# Patient Record
Sex: FEMALE | Race: WHITE | NOT HISPANIC OR LATINO
[De-identification: names, ages, dates, MRNs, and addresses within clinical notes are randomized per-mention and may not be internally consistent; named-entity substitution may affect disease eponyms.]

---

## 2017-01-12 ENCOUNTER — APPOINTMENT (OUTPATIENT)
Dept: VASCULAR SURGERY | Facility: CLINIC | Age: 62
End: 2017-01-12

## 2017-01-12 VITALS
BODY MASS INDEX: 27 KG/M2 | DIASTOLIC BLOOD PRESSURE: 80 MMHG | TEMPERATURE: 98.5 F | SYSTOLIC BLOOD PRESSURE: 126 MMHG | HEIGHT: 61 IN | HEART RATE: 79 BPM | WEIGHT: 143 LBS

## 2017-01-12 VITALS — DIASTOLIC BLOOD PRESSURE: 80 MMHG | SYSTOLIC BLOOD PRESSURE: 123 MMHG | HEART RATE: 76 BPM

## 2017-01-20 ENCOUNTER — APPOINTMENT (OUTPATIENT)
Dept: INTERNAL MEDICINE | Facility: CLINIC | Age: 62
End: 2017-01-20

## 2017-01-20 VITALS
RESPIRATION RATE: 16 BRPM | TEMPERATURE: 98.3 F | WEIGHT: 143 LBS | DIASTOLIC BLOOD PRESSURE: 76 MMHG | OXYGEN SATURATION: 98 % | HEIGHT: 61 IN | HEART RATE: 70 BPM | SYSTOLIC BLOOD PRESSURE: 120 MMHG | BODY MASS INDEX: 27 KG/M2

## 2017-02-13 ENCOUNTER — MEDICATION RENEWAL (OUTPATIENT)
Age: 62
End: 2017-02-13

## 2017-02-17 ENCOUNTER — APPOINTMENT (OUTPATIENT)
Dept: VASCULAR SURGERY | Facility: CLINIC | Age: 62
End: 2017-02-17

## 2017-02-21 ENCOUNTER — APPOINTMENT (OUTPATIENT)
Dept: VASCULAR SURGERY | Facility: CLINIC | Age: 62
End: 2017-02-21

## 2017-03-09 ENCOUNTER — APPOINTMENT (OUTPATIENT)
Dept: VASCULAR SURGERY | Facility: CLINIC | Age: 62
End: 2017-03-09

## 2017-03-09 VITALS
HEART RATE: 87 BPM | SYSTOLIC BLOOD PRESSURE: 123 MMHG | TEMPERATURE: 98.2 F | DIASTOLIC BLOOD PRESSURE: 77 MMHG | WEIGHT: 143 LBS | HEIGHT: 61 IN | BODY MASS INDEX: 27 KG/M2

## 2017-03-09 VITALS — HEART RATE: 78 BPM | SYSTOLIC BLOOD PRESSURE: 117 MMHG | DIASTOLIC BLOOD PRESSURE: 79 MMHG

## 2017-06-15 ENCOUNTER — APPOINTMENT (OUTPATIENT)
Dept: ORTHOPEDIC SURGERY | Facility: CLINIC | Age: 62
End: 2017-06-15

## 2017-06-15 DIAGNOSIS — Z12.11 ENCOUNTER FOR SCREENING FOR MALIGNANT NEOPLASM OF COLON: ICD-10-CM

## 2017-07-03 ENCOUNTER — OUTPATIENT (OUTPATIENT)
Dept: OUTPATIENT SERVICES | Facility: HOSPITAL | Age: 62
LOS: 1 days | End: 2017-07-03
Payer: MEDICAID

## 2017-07-03 ENCOUNTER — APPOINTMENT (OUTPATIENT)
Dept: INTERNAL MEDICINE | Facility: CLINIC | Age: 62
End: 2017-07-03

## 2017-07-03 ENCOUNTER — NON-APPOINTMENT (OUTPATIENT)
Age: 62
End: 2017-07-03

## 2017-07-03 VITALS
HEART RATE: 64 BPM | HEIGHT: 61 IN | BODY MASS INDEX: 26.43 KG/M2 | TEMPERATURE: 98.6 F | SYSTOLIC BLOOD PRESSURE: 120 MMHG | WEIGHT: 140 LBS | DIASTOLIC BLOOD PRESSURE: 70 MMHG | OXYGEN SATURATION: 97 %

## 2017-07-03 VITALS
HEART RATE: 63 BPM | HEIGHT: 61 IN | SYSTOLIC BLOOD PRESSURE: 120 MMHG | WEIGHT: 139.99 LBS | TEMPERATURE: 97 F | RESPIRATION RATE: 16 BRPM | OXYGEN SATURATION: 97 % | DIASTOLIC BLOOD PRESSURE: 74 MMHG

## 2017-07-03 DIAGNOSIS — M65.30 TRIGGER FINGER, UNSPECIFIED FINGER: ICD-10-CM

## 2017-07-03 DIAGNOSIS — Z98.890 OTHER SPECIFIED POSTPROCEDURAL STATES: Chronic | ICD-10-CM

## 2017-07-03 DIAGNOSIS — Z98.51 TUBAL LIGATION STATUS: Chronic | ICD-10-CM

## 2017-07-03 DIAGNOSIS — I83.90 ASYMPTOMATIC VARICOSE VEINS OF UNSPECIFIED LOWER EXTREMITY: ICD-10-CM

## 2017-07-03 DIAGNOSIS — G56.02 CARPAL TUNNEL SYNDROME, LEFT UPPER LIMB: Chronic | ICD-10-CM

## 2017-07-03 DIAGNOSIS — Z01.818 ENCOUNTER FOR OTHER PREPROCEDURAL EXAMINATION: ICD-10-CM

## 2017-07-03 DIAGNOSIS — M65.332 TRIGGER FINGER, LEFT MIDDLE FINGER: ICD-10-CM

## 2017-07-03 LAB
ANION GAP SERPL CALC-SCNC: 19 MMOL/L — HIGH (ref 5–17)
BUN SERPL-MCNC: 12 MG/DL — SIGNIFICANT CHANGE UP (ref 7–23)
CALCIUM SERPL-MCNC: 9.8 MG/DL — SIGNIFICANT CHANGE UP (ref 8.4–10.5)
CHLORIDE SERPL-SCNC: 105 MMOL/L — SIGNIFICANT CHANGE UP (ref 96–108)
CO2 SERPL-SCNC: 21 MMOL/L — LOW (ref 22–31)
CREAT SERPL-MCNC: 0.83 MG/DL — SIGNIFICANT CHANGE UP (ref 0.5–1.3)
GLUCOSE SERPL-MCNC: 73 MG/DL — SIGNIFICANT CHANGE UP (ref 70–99)
HCT VFR BLD CALC: 38.9 % — SIGNIFICANT CHANGE UP (ref 34.5–45)
HGB BLD-MCNC: 12.7 G/DL — SIGNIFICANT CHANGE UP (ref 11.5–15.5)
MCHC RBC-ENTMCNC: 29.7 PG — SIGNIFICANT CHANGE UP (ref 27–34)
MCHC RBC-ENTMCNC: 32.6 GM/DL — SIGNIFICANT CHANGE UP (ref 32–36)
MCV RBC AUTO: 91.1 FL — SIGNIFICANT CHANGE UP (ref 80–100)
PLATELET # BLD AUTO: 213 K/UL — SIGNIFICANT CHANGE UP (ref 150–400)
POTASSIUM SERPL-MCNC: 4.3 MMOL/L — SIGNIFICANT CHANGE UP (ref 3.5–5.3)
POTASSIUM SERPL-SCNC: 4.3 MMOL/L — SIGNIFICANT CHANGE UP (ref 3.5–5.3)
RBC # BLD: 4.27 M/UL — SIGNIFICANT CHANGE UP (ref 3.8–5.2)
RBC # FLD: 14.1 % — SIGNIFICANT CHANGE UP (ref 10.3–14.5)
SODIUM SERPL-SCNC: 145 MMOL/L — SIGNIFICANT CHANGE UP (ref 135–145)
WBC # BLD: 5.56 K/UL — SIGNIFICANT CHANGE UP (ref 3.8–10.5)
WBC # FLD AUTO: 5.56 K/UL — SIGNIFICANT CHANGE UP (ref 3.8–10.5)

## 2017-07-03 PROCEDURE — 85027 COMPLETE CBC AUTOMATED: CPT

## 2017-07-03 PROCEDURE — G0463: CPT

## 2017-07-03 PROCEDURE — 80048 BASIC METABOLIC PNL TOTAL CA: CPT

## 2017-07-03 RX ORDER — AZITHROMYCIN 250 MG/1
250 TABLET, FILM COATED ORAL
Qty: 6 | Refills: 0 | Status: DISCONTINUED | COMMUNITY
Start: 2017-01-02 | End: 2017-07-03

## 2017-07-03 RX ORDER — ALBUTEROL SULFATE 90 UG/1
108 (90 BASE) AEROSOL, METERED RESPIRATORY (INHALATION)
Qty: 18 | Refills: 0 | Status: DISCONTINUED | COMMUNITY
Start: 2017-01-13 | End: 2017-07-03

## 2017-07-03 RX ORDER — LIDOCAINE HCL 20 MG/ML
0.2 VIAL (ML) INJECTION ONCE
Qty: 0 | Refills: 0 | Status: DISCONTINUED | OUTPATIENT
Start: 2017-07-07 | End: 2017-07-22

## 2017-07-03 RX ORDER — ALPRAZOLAM 0.5 MG/1
0.5 TABLET ORAL
Qty: 1 | Refills: 0 | Status: DISCONTINUED | COMMUNITY
Start: 2017-02-13 | End: 2017-07-03

## 2017-07-03 RX ORDER — SODIUM CHLORIDE 9 MG/ML
3 INJECTION INTRAMUSCULAR; INTRAVENOUS; SUBCUTANEOUS EVERY 8 HOURS
Qty: 0 | Refills: 0 | Status: DISCONTINUED | OUTPATIENT
Start: 2017-07-07 | End: 2017-07-22

## 2017-07-03 RX ORDER — LEVOCETIRIZINE DIHYDROCHLORIDE 5 MG/1
5 TABLET ORAL DAILY
Qty: 30 | Refills: 0 | Status: DISCONTINUED | COMMUNITY
Start: 2017-01-20 | End: 2017-07-03

## 2017-07-03 RX ORDER — CELECOXIB 200 MG/1
200 CAPSULE ORAL ONCE
Qty: 0 | Refills: 0 | Status: COMPLETED | OUTPATIENT
Start: 2017-07-07 | End: 2017-07-07

## 2017-07-03 RX ORDER — MOMETASONE 50 UG/1
50 SPRAY, METERED NASAL TWICE DAILY
Qty: 1 | Refills: 2 | Status: DISCONTINUED | COMMUNITY
Start: 2017-01-20 | End: 2017-07-03

## 2017-07-03 RX ORDER — PREDNISONE 10 MG/1
10 TABLET ORAL
Qty: 18 | Refills: 0 | Status: DISCONTINUED | COMMUNITY
Start: 2017-01-13 | End: 2017-07-03

## 2017-07-03 RX ORDER — ACETAMINOPHEN 500 MG
975 TABLET ORAL ONCE
Qty: 0 | Refills: 0 | Status: COMPLETED | OUTPATIENT
Start: 2017-07-07 | End: 2017-07-07

## 2017-07-03 NOTE — H&P PST ADULT - HISTORY OF PRESENT ILLNESS
This is a 60 y.o. female who presented to Dr. Aldana complaining of "tingling" of right hand. Pt evaluated , has right carpal tunnel now for surgery. This is a 60 y.o. female who presented to Dr. Aldana complaining of "tingling" of right hand. Pt evaluated , has right carpal tunnel now for surgery.  th This is a 61 y/o female with PMH: HLD,GERD. s/p (1/2017): Right Leg Varicose Vein Stripping. Now dx: Left Middle Trigger Finger. Scheduled: Left Middle Finger Trigger Release.

## 2017-07-03 NOTE — H&P PST ADULT - NSANTHOSAYNRD_GEN_A_CORE
No. SAI screening performed.  STOP BANG Legend: 0-2 = LOW Risk; 3-4 = INTERMEDIATE Risk; 5-8 = HIGH Risk

## 2017-07-03 NOTE — H&P PST ADULT - FAMILY HISTORY
Mother  Still living? No  Family history of cancer, Age at diagnosis: Age Unknown     Father  Still living? No  Family history of cardiac disorder, Age at diagnosis: Age Unknown

## 2017-07-03 NOTE — H&P PST ADULT - PROBLEM SELECTOR PLAN 2
s/p 1/2017:  Right Leg Varicose Vein Strippin. On ASA 81 mg daily  plan: Last dose ASA 6-20-17 as per PMD

## 2017-07-03 NOTE — H&P PST ADULT - PMH
Carpal tunnel syndrome  Bilateral : surgically treated '04 and '15  GERD (gastroesophageal reflux disease)    Hyperlipidemia  5 years  Multiparity    Varicose vein of leg  1/2017:  Right Leg: surgically treated Carpal tunnel syndrome  Bilateral : surgically treated '04 and '15  GERD (gastroesophageal reflux disease)    Hyperlipidemia  5 years  Multiparity    Trigger finger  Left Middle Finger  Varicose vein of leg  1/2017:  Right Leg: surgically treated

## 2017-07-03 NOTE — H&P PST ADULT - PSH
H/O tubal ligation  ' 1985 H/O tubal ligation  ' 1985  H/O varicose vein stripping  1/2017:  Right Leg  S/p bilateral carpal tunnel release  ' 2004:  Left    ' 2015:  Right  Status post colonoscopy  ' 16   Negative  Status post tubal ligation  ' 85

## 2017-07-07 ENCOUNTER — TRANSCRIPTION ENCOUNTER (OUTPATIENT)
Age: 62
End: 2017-07-07

## 2017-07-07 ENCOUNTER — APPOINTMENT (OUTPATIENT)
Dept: ORTHOPEDIC SURGERY | Facility: HOSPITAL | Age: 62
End: 2017-07-07

## 2017-07-07 ENCOUNTER — OUTPATIENT (OUTPATIENT)
Dept: OUTPATIENT SERVICES | Facility: HOSPITAL | Age: 62
LOS: 1 days | End: 2017-07-07
Payer: MEDICAID

## 2017-07-07 VITALS
RESPIRATION RATE: 18 BRPM | HEART RATE: 70 BPM | SYSTOLIC BLOOD PRESSURE: 122 MMHG | DIASTOLIC BLOOD PRESSURE: 70 MMHG | OXYGEN SATURATION: 100 %

## 2017-07-07 VITALS — WEIGHT: 138.89 LBS | HEIGHT: 60.63 IN

## 2017-07-07 DIAGNOSIS — Z98.890 OTHER SPECIFIED POSTPROCEDURAL STATES: Chronic | ICD-10-CM

## 2017-07-07 DIAGNOSIS — Z98.51 TUBAL LIGATION STATUS: Chronic | ICD-10-CM

## 2017-07-07 DIAGNOSIS — M65.332 TRIGGER FINGER, LEFT MIDDLE FINGER: ICD-10-CM

## 2017-07-07 PROCEDURE — 26055 INCISE FINGER TENDON SHEATH: CPT | Mod: F2

## 2017-07-07 RX ORDER — SODIUM CHLORIDE 9 MG/ML
1000 INJECTION, SOLUTION INTRAVENOUS
Qty: 0 | Refills: 0 | Status: DISCONTINUED | OUTPATIENT
Start: 2017-07-07 | End: 2017-07-22

## 2017-07-07 RX ORDER — ONDANSETRON 8 MG/1
4 TABLET, FILM COATED ORAL ONCE
Qty: 0 | Refills: 0 | Status: DISCONTINUED | OUTPATIENT
Start: 2017-07-07 | End: 2017-07-22

## 2017-07-07 RX ORDER — OXYCODONE HYDROCHLORIDE 5 MG/1
5 TABLET ORAL ONCE
Qty: 0 | Refills: 0 | Status: DISCONTINUED | OUTPATIENT
Start: 2017-07-07 | End: 2017-07-07

## 2017-07-07 RX ORDER — CELECOXIB 200 MG/1
200 CAPSULE ORAL ONCE
Qty: 0 | Refills: 0 | Status: DISCONTINUED | OUTPATIENT
Start: 2017-07-07 | End: 2017-07-22

## 2017-07-07 RX ADMIN — CELECOXIB 200 MILLIGRAM(S): 200 CAPSULE ORAL at 09:41

## 2017-07-07 RX ADMIN — Medication 975 MILLIGRAM(S): at 09:41

## 2017-07-07 NOTE — ASU DISCHARGE PLAN (ADULT/PEDIATRIC). - NOTIFY
Swelling that continues/Numbness, color, or temperature change to extremity/Pain not relieved by Medications/Fever greater than 101/Bleeding that does not stop

## 2017-07-07 NOTE — ASU DISCHARGE PLAN (ADULT/PEDIATRIC). - MEDICATION SUMMARY - MEDICATIONS TO TAKE
I will START or STAY ON the medications listed below when I get home from the hospital:    Asprin  -- 81 milligram(s) by mouth once a day (at bedtime). * Last dose 6-20-17 as per PMD  -- Indication: For home med    Norco 5 mg-325 mg oral tablet  -- 1 tab(s) by mouth every 6 hours  -- Indication: For pain    atorvastatin 20 mg oral tablet  -- 1 tab(s) by mouth once a day (at bedtime)  -- Indication: For home med    PriLOSEC OTC 20 mg oral delayed release tablet  -- 1 tab(s) by mouth once a day (at bedtime)  -- Indication: For home med

## 2017-07-07 NOTE — ASU PATIENT PROFILE, ADULT - PMH
Carpal tunnel syndrome  Bilateral : surgically treated '04 and '15  GERD (gastroesophageal reflux disease)    Hyperlipidemia  5 years  Multiparity    Trigger finger  Left Middle Finger  Varicose vein of leg  1/2017:  Right Leg: surgically treated

## 2017-07-07 NOTE — ASU PATIENT PROFILE, ADULT - VISION (WITH CORRECTIVE LENSES IF THE PATIENT USUALLY WEARS THEM):
Partially impaired: cannot see medication labels or newsprint, but can see obstacles in path, and the surrounding layout; can count fingers at arm's length reading/Partially impaired: cannot see medication labels or newsprint, but can see obstacles in path, and the surrounding layout; can count fingers at arm's length

## 2017-07-07 NOTE — ASU PREOP CHECKLIST - TO WHOM
OR 2 RN Trish OR 2 LAVERN Chambers report received from PRIYA Pickett RN to Trish Dueñas RN to ANAIS Andrea RN

## 2017-07-14 ENCOUNTER — RX RENEWAL (OUTPATIENT)
Age: 62
End: 2017-07-14

## 2017-07-24 ENCOUNTER — APPOINTMENT (OUTPATIENT)
Dept: ORTHOPEDIC SURGERY | Facility: CLINIC | Age: 62
End: 2017-07-24

## 2017-11-03 ENCOUNTER — APPOINTMENT (OUTPATIENT)
Dept: ORTHOPEDIC SURGERY | Facility: CLINIC | Age: 62
End: 2017-11-03
Payer: MEDICAID

## 2017-11-03 VITALS
BODY MASS INDEX: 27 KG/M2 | DIASTOLIC BLOOD PRESSURE: 86 MMHG | SYSTOLIC BLOOD PRESSURE: 139 MMHG | HEIGHT: 61 IN | WEIGHT: 143 LBS | HEART RATE: 64 BPM

## 2017-11-03 DIAGNOSIS — Z87.898 PERSONAL HISTORY OF OTHER SPECIFIED CONDITIONS: ICD-10-CM

## 2017-11-03 PROCEDURE — 72100 X-RAY EXAM L-S SPINE 2/3 VWS: CPT

## 2017-11-03 PROCEDURE — 99215 OFFICE O/P EST HI 40 MIN: CPT

## 2017-11-20 ENCOUNTER — RX RENEWAL (OUTPATIENT)
Age: 62
End: 2017-11-20

## 2017-11-27 ENCOUNTER — APPOINTMENT (OUTPATIENT)
Dept: ORTHOPEDIC SURGERY | Facility: CLINIC | Age: 62
End: 2017-11-27
Payer: MEDICAID

## 2017-11-27 PROCEDURE — 99214 OFFICE O/P EST MOD 30 MIN: CPT

## 2017-11-29 ENCOUNTER — APPOINTMENT (OUTPATIENT)
Dept: INTERNAL MEDICINE | Facility: CLINIC | Age: 62
End: 2017-11-29
Payer: MEDICAID

## 2017-11-29 VITALS
BODY MASS INDEX: 27 KG/M2 | DIASTOLIC BLOOD PRESSURE: 74 MMHG | SYSTOLIC BLOOD PRESSURE: 124 MMHG | HEART RATE: 66 BPM | TEMPERATURE: 98.7 F | WEIGHT: 143 LBS | OXYGEN SATURATION: 97 % | HEIGHT: 61 IN

## 2017-11-29 PROCEDURE — 99396 PREV VISIT EST AGE 40-64: CPT | Mod: 25

## 2017-11-29 PROCEDURE — 36415 COLL VENOUS BLD VENIPUNCTURE: CPT

## 2017-11-29 PROCEDURE — 90688 IIV4 VACCINE SPLT 0.5 ML IM: CPT

## 2017-11-29 PROCEDURE — G0008: CPT

## 2017-11-29 RX ORDER — HYDROCODONE BITARTRATE AND ACETAMINOPHEN 5; 325 MG/1; MG/1
5-325 TABLET ORAL
Qty: 10 | Refills: 0 | Status: DISCONTINUED | COMMUNITY
Start: 2017-07-05 | End: 2017-11-29

## 2017-11-29 RX ORDER — NAPROXEN 500 MG/1
500 TABLET ORAL
Qty: 60 | Refills: 0 | Status: DISCONTINUED | COMMUNITY
Start: 2017-11-03 | End: 2017-11-29

## 2017-11-30 ENCOUNTER — MOBILE ON CALL (OUTPATIENT)
Age: 62
End: 2017-11-30

## 2017-12-02 LAB
CHOLEST SERPL-MCNC: 151 MG/DL
CHOLEST/HDLC SERPL: 2.9 RATIO
HBA1C MFR BLD HPLC: 5.7 %
HCV AB SER QL: NONREACTIVE
HCV S/CO RATIO: 0.29 S/CO
HDLC SERPL-MCNC: 52 MG/DL
LDLC SERPL CALC-MCNC: 73 MG/DL
TRIGL SERPL-MCNC: 129 MG/DL

## 2017-12-14 LAB
ALBUMIN SERPL ELPH-MCNC: 4.6 G/DL
ALP BLD-CCNC: 84 U/L
ALT SERPL-CCNC: 9 U/L
ANION GAP SERPL CALC-SCNC: 15 MMOL/L
AST SERPL-CCNC: 18 U/L
BASOPHILS # BLD AUTO: 0.03 K/UL
BASOPHILS NFR BLD AUTO: 0.6 %
BILIRUB SERPL-MCNC: 0.3 MG/DL
BUN SERPL-MCNC: 13 MG/DL
CALCIUM SERPL-MCNC: 9.5 MG/DL
CHLORIDE SERPL-SCNC: 106 MMOL/L
CO2 SERPL-SCNC: 25 MMOL/L
CREAT SERPL-MCNC: 0.75 MG/DL
EOSINOPHIL # BLD AUTO: 0.4 K/UL
EOSINOPHIL NFR BLD AUTO: 8.1 %
GLUCOSE SERPL-MCNC: 87 MG/DL
HCT VFR BLD CALC: 38.4 %
HGB BLD-MCNC: 12.2 G/DL
IMM GRANULOCYTES NFR BLD AUTO: 0 %
LYMPHOCYTES # BLD AUTO: 1.88 K/UL
LYMPHOCYTES NFR BLD AUTO: 38 %
MAN DIFF?: NORMAL
MCHC RBC-ENTMCNC: 30 PG
MCHC RBC-ENTMCNC: 31.8 GM/DL
MCV RBC AUTO: 94.3 FL
MONOCYTES # BLD AUTO: 0.42 K/UL
MONOCYTES NFR BLD AUTO: 8.5 %
NEUTROPHILS # BLD AUTO: 2.22 K/UL
NEUTROPHILS NFR BLD AUTO: 44.8 %
PLATELET # BLD AUTO: 233 K/UL
POTASSIUM SERPL-SCNC: 4.5 MMOL/L
PROT SERPL-MCNC: 7.2 G/DL
RBC # BLD: 4.07 M/UL
RBC # FLD: 13.4 %
SODIUM SERPL-SCNC: 146 MMOL/L
WBC # FLD AUTO: 4.95 K/UL

## 2017-12-28 ENCOUNTER — APPOINTMENT (OUTPATIENT)
Dept: ORTHOPEDIC SURGERY | Facility: CLINIC | Age: 62
End: 2017-12-28
Payer: MEDICAID

## 2017-12-28 PROCEDURE — 99214 OFFICE O/P EST MOD 30 MIN: CPT

## 2018-01-18 ENCOUNTER — APPOINTMENT (OUTPATIENT)
Dept: ORTHOPEDIC SURGERY | Facility: CLINIC | Age: 63
End: 2018-01-18
Payer: MEDICAID

## 2018-01-18 PROCEDURE — 99214 OFFICE O/P EST MOD 30 MIN: CPT

## 2018-01-26 ENCOUNTER — FORM ENCOUNTER (OUTPATIENT)
Age: 63
End: 2018-01-26

## 2018-01-27 ENCOUNTER — OUTPATIENT (OUTPATIENT)
Dept: OUTPATIENT SERVICES | Facility: HOSPITAL | Age: 63
LOS: 1 days | End: 2018-01-27
Payer: MEDICAID

## 2018-01-27 ENCOUNTER — APPOINTMENT (OUTPATIENT)
Dept: MRI IMAGING | Facility: CLINIC | Age: 63
End: 2018-01-27
Payer: MEDICAID

## 2018-01-27 DIAGNOSIS — Z98.51 TUBAL LIGATION STATUS: Chronic | ICD-10-CM

## 2018-01-27 DIAGNOSIS — Z98.890 OTHER SPECIFIED POSTPROCEDURAL STATES: Chronic | ICD-10-CM

## 2018-01-27 DIAGNOSIS — Z00.8 ENCOUNTER FOR OTHER GENERAL EXAMINATION: ICD-10-CM

## 2018-01-27 PROCEDURE — 72148 MRI LUMBAR SPINE W/O DYE: CPT | Mod: 26

## 2018-01-27 PROCEDURE — 72148 MRI LUMBAR SPINE W/O DYE: CPT

## 2018-02-01 ENCOUNTER — APPOINTMENT (OUTPATIENT)
Dept: ORTHOPEDIC SURGERY | Facility: CLINIC | Age: 63
End: 2018-02-01
Payer: MEDICAID

## 2018-02-01 PROCEDURE — 99214 OFFICE O/P EST MOD 30 MIN: CPT

## 2018-03-01 ENCOUNTER — OUTPATIENT (OUTPATIENT)
Dept: OUTPATIENT SERVICES | Facility: HOSPITAL | Age: 63
LOS: 1 days | End: 2018-03-01
Payer: MEDICAID

## 2018-03-01 DIAGNOSIS — Z98.51 TUBAL LIGATION STATUS: Chronic | ICD-10-CM

## 2018-03-01 DIAGNOSIS — M54.16 RADICULOPATHY, LUMBAR REGION: ICD-10-CM

## 2018-03-01 DIAGNOSIS — Z98.890 OTHER SPECIFIED POSTPROCEDURAL STATES: Chronic | ICD-10-CM

## 2018-03-01 PROCEDURE — 77003 FLUOROGUIDE FOR SPINE INJECT: CPT

## 2018-03-01 PROCEDURE — 62323 NJX INTERLAMINAR LMBR/SAC: CPT

## 2018-03-12 ENCOUNTER — APPOINTMENT (OUTPATIENT)
Dept: GASTROENTEROLOGY | Facility: CLINIC | Age: 63
End: 2018-03-12

## 2018-04-12 ENCOUNTER — OUTPATIENT (OUTPATIENT)
Dept: OUTPATIENT SERVICES | Facility: HOSPITAL | Age: 63
LOS: 1 days | End: 2018-04-12
Payer: MEDICAID

## 2018-04-12 DIAGNOSIS — Z98.890 OTHER SPECIFIED POSTPROCEDURAL STATES: Chronic | ICD-10-CM

## 2018-04-12 DIAGNOSIS — Z98.51 TUBAL LIGATION STATUS: Chronic | ICD-10-CM

## 2018-04-12 DIAGNOSIS — M54.16 RADICULOPATHY, LUMBAR REGION: ICD-10-CM

## 2018-04-12 PROCEDURE — 77003 FLUOROGUIDE FOR SPINE INJECT: CPT

## 2018-04-12 PROCEDURE — 62323 NJX INTERLAMINAR LMBR/SAC: CPT

## 2018-05-11 ENCOUNTER — MOBILE ON CALL (OUTPATIENT)
Age: 63
End: 2018-05-11

## 2018-05-11 ENCOUNTER — APPOINTMENT (OUTPATIENT)
Dept: VASCULAR SURGERY | Facility: CLINIC | Age: 63
End: 2018-05-11
Payer: MEDICAID

## 2018-05-11 VITALS — SYSTOLIC BLOOD PRESSURE: 133 MMHG | DIASTOLIC BLOOD PRESSURE: 81 MMHG | HEART RATE: 61 BPM

## 2018-05-11 VITALS
BODY MASS INDEX: 28.89 KG/M2 | HEART RATE: 66 BPM | WEIGHT: 153 LBS | DIASTOLIC BLOOD PRESSURE: 81 MMHG | TEMPERATURE: 98.1 F | SYSTOLIC BLOOD PRESSURE: 135 MMHG | HEIGHT: 61 IN

## 2018-05-11 PROCEDURE — 93970 EXTREMITY STUDY: CPT

## 2018-05-11 PROCEDURE — 99212 OFFICE O/P EST SF 10 MIN: CPT

## 2018-06-04 ENCOUNTER — MEDICATION RENEWAL (OUTPATIENT)
Age: 63
End: 2018-06-04

## 2018-06-04 RX ORDER — ALPRAZOLAM 0.5 MG/1
0.5 TABLET ORAL
Qty: 1 | Refills: 0 | Status: COMPLETED | COMMUNITY
Start: 2018-06-04 | End: 2018-06-09

## 2018-06-04 RX ORDER — SODIUM CHLORIDE 9 G/ML
0.9 INJECTION, SOLUTION INTRAVENOUS
Qty: 500 | Refills: 0 | Status: COMPLETED | COMMUNITY
Start: 2018-06-04 | End: 2018-06-09

## 2018-06-04 RX ORDER — LIDOCAINE HYDROCHLORIDE 10 MG/ML
1 INJECTION, SOLUTION INFILTRATION; PERINEURAL
Qty: 50 | Refills: 0 | Status: COMPLETED | COMMUNITY
Start: 2018-06-04 | End: 2018-06-09

## 2018-06-04 RX ORDER — SODIUM BICARBONATE 84 MG/ML
8.4 INJECTION, SOLUTION INTRAVENOUS
Qty: 5 | Refills: 0 | Status: COMPLETED | COMMUNITY
Start: 2018-06-04 | End: 2018-06-09

## 2018-06-08 ENCOUNTER — APPOINTMENT (OUTPATIENT)
Dept: VASCULAR SURGERY | Facility: CLINIC | Age: 63
End: 2018-06-08
Payer: MEDICAID

## 2018-06-08 PROCEDURE — 36475 ENDOVENOUS RF 1ST VEIN: CPT | Mod: RT

## 2018-06-08 PROCEDURE — 37765 STAB PHLEB VEINS XTR 10-20: CPT | Mod: RT

## 2018-06-12 ENCOUNTER — APPOINTMENT (OUTPATIENT)
Dept: VASCULAR SURGERY | Facility: CLINIC | Age: 63
End: 2018-06-12
Payer: MEDICAID

## 2018-06-12 PROCEDURE — 93970 EXTREMITY STUDY: CPT

## 2018-06-21 ENCOUNTER — OUTPATIENT (OUTPATIENT)
Dept: OUTPATIENT SERVICES | Facility: HOSPITAL | Age: 63
LOS: 1 days | End: 2018-06-21
Payer: MEDICAID

## 2018-06-21 DIAGNOSIS — Z98.890 OTHER SPECIFIED POSTPROCEDURAL STATES: Chronic | ICD-10-CM

## 2018-06-21 DIAGNOSIS — Z98.51 TUBAL LIGATION STATUS: Chronic | ICD-10-CM

## 2018-06-21 DIAGNOSIS — M54.16 RADICULOPATHY, LUMBAR REGION: ICD-10-CM

## 2018-06-21 PROCEDURE — 77003 FLUOROGUIDE FOR SPINE INJECT: CPT

## 2018-06-21 PROCEDURE — 62323 NJX INTERLAMINAR LMBR/SAC: CPT

## 2018-06-22 ENCOUNTER — APPOINTMENT (OUTPATIENT)
Dept: ORTHOPEDIC SURGERY | Facility: CLINIC | Age: 63
End: 2018-06-22
Payer: MEDICAID

## 2018-06-22 PROCEDURE — 99214 OFFICE O/P EST MOD 30 MIN: CPT

## 2018-07-01 ENCOUNTER — OUTPATIENT (OUTPATIENT)
Dept: OUTPATIENT SERVICES | Facility: HOSPITAL | Age: 63
LOS: 1 days | End: 2018-07-01
Payer: MEDICAID

## 2018-07-01 DIAGNOSIS — Z98.890 OTHER SPECIFIED POSTPROCEDURAL STATES: Chronic | ICD-10-CM

## 2018-07-01 DIAGNOSIS — Z98.51 TUBAL LIGATION STATUS: Chronic | ICD-10-CM

## 2018-07-01 PROCEDURE — G9001: CPT

## 2018-07-06 ENCOUNTER — APPOINTMENT (OUTPATIENT)
Dept: VASCULAR SURGERY | Facility: CLINIC | Age: 63
End: 2018-07-06
Payer: MEDICAID

## 2018-07-06 VITALS
TEMPERATURE: 98.3 F | HEIGHT: 61 IN | SYSTOLIC BLOOD PRESSURE: 126 MMHG | WEIGHT: 153 LBS | DIASTOLIC BLOOD PRESSURE: 84 MMHG | BODY MASS INDEX: 28.89 KG/M2 | HEART RATE: 72 BPM

## 2018-07-06 VITALS — HEART RATE: 67 BPM | SYSTOLIC BLOOD PRESSURE: 126 MMHG | DIASTOLIC BLOOD PRESSURE: 83 MMHG

## 2018-07-06 PROCEDURE — 99024 POSTOP FOLLOW-UP VISIT: CPT

## 2018-07-16 ENCOUNTER — APPOINTMENT (OUTPATIENT)
Dept: ORTHOPEDIC SURGERY | Facility: CLINIC | Age: 63
End: 2018-07-16
Payer: MEDICAID

## 2018-07-16 PROCEDURE — 99215 OFFICE O/P EST HI 40 MIN: CPT

## 2018-07-18 PROBLEM — G56.00 CARPAL TUNNEL SYNDROME, UNSPECIFIED UPPER LIMB: Chronic | Status: ACTIVE | Noted: 2017-07-03

## 2018-07-18 PROBLEM — I83.90 ASYMPTOMATIC VARICOSE VEINS OF UNSPECIFIED LOWER EXTREMITY: Chronic | Status: ACTIVE | Noted: 2017-07-03

## 2018-07-20 ENCOUNTER — OUTPATIENT (OUTPATIENT)
Dept: OUTPATIENT SERVICES | Facility: HOSPITAL | Age: 63
LOS: 1 days | End: 2018-07-20
Payer: MEDICAID

## 2018-07-20 VITALS
SYSTOLIC BLOOD PRESSURE: 121 MMHG | HEIGHT: 61 IN | RESPIRATION RATE: 15 BRPM | WEIGHT: 147.05 LBS | HEART RATE: 81 BPM | TEMPERATURE: 98 F | DIASTOLIC BLOOD PRESSURE: 81 MMHG | OXYGEN SATURATION: 99 %

## 2018-07-20 DIAGNOSIS — M51.26 OTHER INTERVERTEBRAL DISC DISPLACEMENT, LUMBAR REGION: ICD-10-CM

## 2018-07-20 DIAGNOSIS — Z98.890 OTHER SPECIFIED POSTPROCEDURAL STATES: Chronic | ICD-10-CM

## 2018-07-20 DIAGNOSIS — Z29.9 ENCOUNTER FOR PROPHYLACTIC MEASURES, UNSPECIFIED: ICD-10-CM

## 2018-07-20 DIAGNOSIS — M65.332 TRIGGER FINGER, LEFT MIDDLE FINGER: Chronic | ICD-10-CM

## 2018-07-20 DIAGNOSIS — Z98.51 TUBAL LIGATION STATUS: Chronic | ICD-10-CM

## 2018-07-20 LAB
HCT VFR BLD CALC: 38 % — SIGNIFICANT CHANGE UP (ref 34.5–45)
HGB BLD-MCNC: 12 G/DL — SIGNIFICANT CHANGE UP (ref 11.5–15.5)
MCHC RBC-ENTMCNC: 29.2 PG — SIGNIFICANT CHANGE UP (ref 27–34)
MCHC RBC-ENTMCNC: 31.6 GM/DL — LOW (ref 32–36)
MCV RBC AUTO: 92.5 FL — SIGNIFICANT CHANGE UP (ref 80–100)
PLATELET # BLD AUTO: 262 K/UL — SIGNIFICANT CHANGE UP (ref 150–400)
RBC # BLD: 4.11 M/UL — SIGNIFICANT CHANGE UP (ref 3.8–5.2)
RBC # FLD: 14.1 % — SIGNIFICANT CHANGE UP (ref 10.3–14.5)
WBC # BLD: 5.35 K/UL — SIGNIFICANT CHANGE UP (ref 3.8–10.5)
WBC # FLD AUTO: 5.35 K/UL — SIGNIFICANT CHANGE UP (ref 3.8–10.5)

## 2018-07-20 PROCEDURE — G0463: CPT

## 2018-07-20 PROCEDURE — 87641 MR-STAPH DNA AMP PROBE: CPT

## 2018-07-20 PROCEDURE — 87640 STAPH A DNA AMP PROBE: CPT

## 2018-07-20 PROCEDURE — 85027 COMPLETE CBC AUTOMATED: CPT

## 2018-07-20 RX ORDER — CEFAZOLIN SODIUM 1 G
2000 VIAL (EA) INJECTION ONCE
Qty: 0 | Refills: 0 | Status: DISCONTINUED | OUTPATIENT
Start: 2018-07-24 | End: 2018-07-24

## 2018-07-20 RX ORDER — SODIUM CHLORIDE 9 MG/ML
3 INJECTION INTRAMUSCULAR; INTRAVENOUS; SUBCUTANEOUS EVERY 8 HOURS
Qty: 0 | Refills: 0 | Status: DISCONTINUED | OUTPATIENT
Start: 2018-07-24 | End: 2018-07-24

## 2018-07-20 NOTE — H&P PST ADULT - PSH
H/O tubal ligation  ' 1985  H/O varicose vein stripping  1/2017, 6/2018, Right Leg  S/p bilateral carpal tunnel release  ' 2004:  Left    ' 2015:  Right  Status post colonoscopy  ' 16   Negative  Trigger finger, left middle finger  release, 7/2017

## 2018-07-20 NOTE — H&P PST ADULT - ACTIVITY
walks 2 miles daily. Climbs stairs, able to perform strenuous activity walks 2 miles daily, climbs stairs, able to perform strenuous activity

## 2018-07-20 NOTE — H&P PST ADULT - HISTORY OF PRESENT ILLNESS
62 y/o female with PMHx of HLD, GERD c/o lower back pain for past 8 months radiating down left leg with associated numbness and tingling in left foot 64 y/o female with PMHx of HLD, GERD c/o lower back pain for past 8 months radiating down left leg with associated numbness and tingling in left foot. Presents to PST for a scheduled L4-5 posterior bilateral lumbar laminectomy and discectomy on 7/24/2018. 62 y/o female with PMHx of HLD, GERD c/o worsening lower back pain for past 8 months radiating down left leg with associated numbness and tingling in left foot. s/p diagnostic imaging revealed disc herniation. Presents to PST for a scheduled L4-5 posterior bilateral lumbar laminectomy and discectomy on 7/24/2018.

## 2018-07-20 NOTE — H&P PST ADULT - PROBLEM SELECTOR PLAN 2
The Caprini score indicates that this patient is at high risk for a VTE event (score = 6).    Surgical patients in this group will benefit from both pharmacologic prophylaxis and intermittent compression devices. The surgical team will determine the balance between VTE risk and bleeding risk, and other clinical considerations.

## 2018-07-20 NOTE — H&P PST ADULT - COMMENTS
Vascular: s/p (1/2017) Right Leg Varicose Vein Stripping Vascular: s/p Right Leg Varicose Vein Stripping

## 2018-07-20 NOTE — H&P PST ADULT - PROBLEM SELECTOR PLAN 1
Scheduled L4-5 posterior bilateral lumbar laminectomy and discectomy on 7/24/2018  CBC & nasal swab sent at [facility]  Pt stopped low-dose aspirin 1 wk pre-op per Dr. Be, last dose 7/17/18  Pre-op instructions given to pt including chlorhexidine soap

## 2018-07-20 NOTE — H&P PST ADULT - NS PRO TALK SOMEONE YN
Discussed importance of compliance with ocular meds and follow up exams to prevent loss of vision. no

## 2018-07-20 NOTE — H&P PST ADULT - PMH
Carpal tunnel syndrome  Bilateral : surgically treated '04 and '15  GERD (gastroesophageal reflux disease)    Hyperlipidemia  5 years  Multiparity    Trigger finger  Left Middle Finger  Varicose vein of leg  1/2017:  Right Leg: surgically treated Carpal tunnel syndrome  Bilateral : surgically treated '04 and '15  GERD (gastroesophageal reflux disease)    Hyperlipidemia  5 years  Multiparity    Other intervertebral disc displacement, lumbar region    Trigger finger  Left Middle Finger  Varicose vein of leg  1/2017:  Right Leg: surgically treated

## 2018-07-20 NOTE — H&P PST ADULT - ASSESSMENT
CAPRINI SCORE [CLOT]    AGE RELATED RISK FACTORS                                                       MOBILITY RELATED FACTORS  [ ] Age 41-60 years                                            (1 Point)                  [ ] Bed rest                                                        (1 Point)  [x] Age: 61-74 years                                           (2 Points)                 [ ] Plaster cast                                                   (2 Points)  [ ] Age= 75 years                                              (3 Points)                 [ ] Bed bound for more than 72 hours                 (2 Points)    DISEASE RELATED RISK FACTORS                                               GENDER SPECIFIC FACTORS  [ ] Edema in the lower extremities                       (1 Point)                  [ ] Pregnancy                                                     (1 Point)  [x] Varicose veins                                               (1 Point)                  [ ] Post-partum < 6 weeks                                   (1 Point)             [x] BMI > 25 Kg/m2                                            (1 Point)                  [ ] Hormonal therapy  or oral contraception          (1 Point)                 [ ] Sepsis (in the previous month)                        (1 Point)                  [ ] History of pregnancy complications                 (1 point)  [ ] Pneumonia or serious lung disease                                               [ ] Unexplained or recurrent                     (1 Point)           (in the previous month)                               (1 Point)  [ ] Abnormal pulmonary function test                     (1 Point)                 SURGERY RELATED RISK FACTORS  [ ] Acute myocardial infarction                              (1 Point)                 [ ]  Section                                             (1 Point)  [ ] Congestive heart failure (in the previous month)  (1 Point)               [ ] Minor surgery                                                  (1 Point)   [ ] Inflammatory bowel disease                             (1 Point)                 [ ] Arthroscopic surgery                                        (2 Points)  [ ] Central venous access                                      (2 Points)                [x] General surgery lasting more than 45 minutes   (2 Points)       [ ] Stroke (in the previous month)                          (5 Points)               [ ] Elective arthroplasty                                         (5 Points)                                                                                                                                               HEMATOLOGY RELATED FACTORS                                                 TRAUMA RELATED RISK FACTORS  [ ] Prior episodes of VTE                                     (3 Points)                 [ ] Fracture of the hip, pelvis, or leg                       (5 Points)  [ ] Positive family history for VTE                         (3 Points)                 [ ] Acute spinal cord injury (in the previous month)  (5 Points)  [ ] Prothrombin 37905 A                                     (3 Points)                 [ ] Paralysis  (less than 1 month)                             (5 Points)  [ ] Factor V Leiden                                             (3 Points)                  [ ] Multiple Trauma within 1 month                        (5 Points)  [ ] Lupus anticoagulants                                     (3 Points)                                                           [ ] Anticardiolipin antibodies                               (3 Points)                                                       [ ] High homocysteine in the blood                      (3 Points)                                             [ ] Other congenital or acquired thrombophilia      (3 Points)                                                [ ] Heparin induced thrombocytopenia                  (3 Points)                                          Total Score [  6   ]

## 2018-07-21 LAB
MRSA PCR RESULT.: SIGNIFICANT CHANGE UP
S AUREUS DNA NOSE QL NAA+PROBE: SIGNIFICANT CHANGE UP

## 2018-07-23 ENCOUNTER — NON-APPOINTMENT (OUTPATIENT)
Age: 63
End: 2018-07-23

## 2018-07-23 ENCOUNTER — TRANSCRIPTION ENCOUNTER (OUTPATIENT)
Age: 63
End: 2018-07-23

## 2018-07-23 ENCOUNTER — APPOINTMENT (OUTPATIENT)
Dept: INTERNAL MEDICINE | Facility: CLINIC | Age: 63
End: 2018-07-23
Payer: MEDICAID

## 2018-07-23 VITALS
DIASTOLIC BLOOD PRESSURE: 80 MMHG | WEIGHT: 147 LBS | BODY MASS INDEX: 27.75 KG/M2 | OXYGEN SATURATION: 98 % | TEMPERATURE: 98.1 F | SYSTOLIC BLOOD PRESSURE: 126 MMHG | HEIGHT: 61 IN | HEART RATE: 69 BPM

## 2018-07-23 DIAGNOSIS — Z87.898 PERSONAL HISTORY OF OTHER SPECIFIED CONDITIONS: ICD-10-CM

## 2018-07-23 PROCEDURE — 99242 OFF/OP CONSLTJ NEW/EST SF 20: CPT | Mod: 25

## 2018-07-23 PROCEDURE — 93000 ELECTROCARDIOGRAM COMPLETE: CPT

## 2018-07-23 RX ORDER — PREDNISONE 20 MG/1
20 TABLET ORAL
Qty: 26 | Refills: 0 | Status: DISCONTINUED | COMMUNITY
Start: 2018-06-22 | End: 2018-07-23

## 2018-07-23 RX ORDER — IBUPROFEN 800 MG/1
800 TABLET, FILM COATED ORAL
Qty: 90 | Refills: 0 | Status: DISCONTINUED | COMMUNITY
Start: 2018-06-22 | End: 2018-07-23

## 2018-07-23 RX ORDER — IBUPROFEN 800 MG/1
800 TABLET, FILM COATED ORAL
Qty: 90 | Refills: 0 | Status: DISCONTINUED | COMMUNITY
Start: 2017-11-27 | End: 2018-07-23

## 2018-07-23 RX ORDER — DICLOFENAC SODIUM 75 MG/1
75 TABLET, DELAYED RELEASE ORAL
Qty: 60 | Refills: 0 | Status: DISCONTINUED | COMMUNITY
Start: 2018-01-18 | End: 2018-07-23

## 2018-07-23 RX ORDER — IBUPROFEN 800 MG/1
800 TABLET, FILM COATED ORAL
Qty: 90 | Refills: 0 | Status: DISCONTINUED | COMMUNITY
Start: 2017-12-28 | End: 2018-07-23

## 2018-07-23 RX ORDER — PREDNISONE 20 MG/1
20 TABLET ORAL
Qty: 26 | Refills: 0 | Status: DISCONTINUED | COMMUNITY
Start: 2017-12-28 | End: 2018-07-23

## 2018-07-23 NOTE — REVIEW OF SYSTEMS
[Fever] : no fever [Night Sweats] : no night sweats [Vision Problems] : no vision problems [Nasal Discharge] : no nasal discharge [Chest Pain] : no chest pain [Lower Ext Edema] : no lower extremity edema [Orthopnea] : no orthopnea [Dyspnea on Exertion] : no dyspnea on exertion [Abdominal Pain] : no abdominal pain [Dysuria] : no dysuria [Joint Pain] : no joint pain [Skin Rash] : no skin rash [Headache] : no headache [Depression] : no depression

## 2018-07-23 NOTE — ASSESSMENT
[High Risk Surgery - Intraperitoneal, Intrathoracic or Supringuinal Vascular Procedures] : High Risk Surgery - Intraperitoneal, Intrathoracic or Supringuinal Vascular Procedures - No (0) [Ischemic Heart Disease] : Ischemic Heart Disease - No (0) [Congestive Heart Failure] : Congestive Heart Failure - No (0) [Prior Cerebrovascular Accident or TIA] : Prior Cerebrovascular Accident or TIA - No (0) [Creatinine >= 2mg/dL (1 Point)] : Creatinine >= 2mg/dL - No (0) [Insulin-dependent Diabetic (1 Point)] : Insulin-dependent Diabetic - No (0) [0] : 0 , RCRI Class: I, Risk of Post-Op Cardiac Complications: 0.4%, Procedure Risk: Low-Risk [As per surgery] : as per surgery [FreeTextEntry4] : EKG nsr nl axis/int no st t chg no q waves\par \par No red flag sx. rcri score 0. good exercise tolerance. normal exam. no high risk meds. proceed to surgery with no further cardiac risk strat. dvt ppx per surgery. \par \par utd mammo. \par hx varicose veins now sp ablation doing well.

## 2018-07-23 NOTE — HISTORY OF PRESENT ILLNESS
[( Patient denies any chest pain, claudication, dyspnea on exertion, orthopnea, palpitations or syncope )] : Patient denies any chest pain, claudication, dyspnea on exertion, orthopnea, palpitations or syncope. [Moderate (4-6 METs)] : Moderate (4-6 METs) [Coronary Artery Disease] : no coronary artery disease [Diabetes] : no diabetes [Sleep Apnea] : no sleep apnea [COPD] : no COPD [Previous Adverse Anesthesia Reaction] : no previous adverse anesthesia reaction [FreeTextEntry1] : Laminectomy, discectomy [FreeTextEntry2] : 7/24/18 [FreeTextEntry3] : Dr. Be [FreeTextEntry4] : Over 1y of sciatica, left leg pain. Has had injections, medicatinos; now awaiting surgery tmrw.\par Has been off asa, nsaids x 1w\par Feeling well. \par Hx surgeries tolerated fine.

## 2018-07-24 ENCOUNTER — APPOINTMENT (OUTPATIENT)
Dept: ORTHOPEDIC SURGERY | Facility: HOSPITAL | Age: 63
End: 2018-07-24
Payer: MEDICAID

## 2018-07-24 ENCOUNTER — RESULT REVIEW (OUTPATIENT)
Age: 63
End: 2018-07-24

## 2018-07-24 ENCOUNTER — INPATIENT (INPATIENT)
Facility: HOSPITAL | Age: 63
LOS: 0 days | Discharge: HOME | DRG: 520 | End: 2018-07-25
Attending: ORTHOPAEDIC SURGERY | Admitting: ORTHOPAEDIC SURGERY
Payer: MEDICAID

## 2018-07-24 VITALS
HEIGHT: 61 IN | DIASTOLIC BLOOD PRESSURE: 86 MMHG | WEIGHT: 147.05 LBS | RESPIRATION RATE: 16 BRPM | HEART RATE: 80 BPM | TEMPERATURE: 98 F | OXYGEN SATURATION: 100 % | SYSTOLIC BLOOD PRESSURE: 123 MMHG

## 2018-07-24 DIAGNOSIS — M51.26 OTHER INTERVERTEBRAL DISC DISPLACEMENT, LUMBAR REGION: ICD-10-CM

## 2018-07-24 DIAGNOSIS — M65.332 TRIGGER FINGER, LEFT MIDDLE FINGER: Chronic | ICD-10-CM

## 2018-07-24 DIAGNOSIS — Z98.51 TUBAL LIGATION STATUS: Chronic | ICD-10-CM

## 2018-07-24 DIAGNOSIS — Z98.890 OTHER SPECIFIED POSTPROCEDURAL STATES: Chronic | ICD-10-CM

## 2018-07-24 PROCEDURE — XXXXX: CPT

## 2018-07-24 PROCEDURE — 72020 X-RAY EXAM OF SPINE 1 VIEW: CPT | Mod: 26

## 2018-07-24 PROCEDURE — 88311 DECALCIFY TISSUE: CPT | Mod: 26

## 2018-07-24 PROCEDURE — 63030 LAMOT DCMPRN NRV RT 1 LMBR: CPT | Mod: 50

## 2018-07-24 PROCEDURE — 88304 TISSUE EXAM BY PATHOLOGIST: CPT | Mod: 26

## 2018-07-24 RX ORDER — PANTOPRAZOLE SODIUM 20 MG/1
40 TABLET, DELAYED RELEASE ORAL
Qty: 0 | Refills: 0 | Status: DISCONTINUED | OUTPATIENT
Start: 2018-07-24 | End: 2018-07-25

## 2018-07-24 RX ORDER — ONDANSETRON 8 MG/1
4 TABLET, FILM COATED ORAL EVERY 6 HOURS
Qty: 0 | Refills: 0 | Status: DISCONTINUED | OUTPATIENT
Start: 2018-07-24 | End: 2018-07-25

## 2018-07-24 RX ORDER — ACETAMINOPHEN 500 MG
650 TABLET ORAL EVERY 6 HOURS
Qty: 0 | Refills: 0 | Status: DISCONTINUED | OUTPATIENT
Start: 2018-07-24 | End: 2018-07-25

## 2018-07-24 RX ORDER — OXYCODONE AND ACETAMINOPHEN 5; 325 MG/1; MG/1
1 TABLET ORAL EVERY 4 HOURS
Qty: 0 | Refills: 0 | Status: DISCONTINUED | OUTPATIENT
Start: 2018-07-24 | End: 2018-07-25

## 2018-07-24 RX ORDER — SODIUM CHLORIDE 9 MG/ML
1000 INJECTION INTRAMUSCULAR; INTRAVENOUS; SUBCUTANEOUS
Qty: 0 | Refills: 0 | Status: DISCONTINUED | OUTPATIENT
Start: 2018-07-24 | End: 2018-07-25

## 2018-07-24 RX ORDER — SENNA PLUS 8.6 MG/1
2 TABLET ORAL AT BEDTIME
Qty: 0 | Refills: 0 | Status: DISCONTINUED | OUTPATIENT
Start: 2018-07-24 | End: 2018-07-25

## 2018-07-24 RX ORDER — DEXAMETHASONE 0.5 MG/5ML
1 ELIXIR ORAL EVERY 6 HOURS
Qty: 0 | Refills: 0 | Status: CANCELLED | OUTPATIENT
Start: 2018-07-27 | End: 2018-07-25

## 2018-07-24 RX ORDER — DEXAMETHASONE 0.5 MG/5ML
3 ELIXIR ORAL EVERY 6 HOURS
Qty: 0 | Refills: 0 | Status: DISCONTINUED | OUTPATIENT
Start: 2018-07-26 | End: 2018-07-25

## 2018-07-24 RX ORDER — DEXAMETHASONE 0.5 MG/5ML
5 ELIXIR ORAL EVERY 6 HOURS
Qty: 0 | Refills: 0 | Status: DISCONTINUED | OUTPATIENT
Start: 2018-07-25 | End: 2018-07-25

## 2018-07-24 RX ORDER — CEFAZOLIN SODIUM 1 G
2000 VIAL (EA) INJECTION EVERY 8 HOURS
Qty: 0 | Refills: 0 | Status: COMPLETED | OUTPATIENT
Start: 2018-07-24 | End: 2018-07-24

## 2018-07-24 RX ORDER — MAGNESIUM HYDROXIDE 400 MG/1
30 TABLET, CHEWABLE ORAL EVERY 12 HOURS
Qty: 0 | Refills: 0 | Status: DISCONTINUED | OUTPATIENT
Start: 2018-07-24 | End: 2018-07-25

## 2018-07-24 RX ORDER — LIDOCAINE HCL 20 MG/ML
0.2 VIAL (ML) INJECTION ONCE
Qty: 0 | Refills: 0 | Status: COMPLETED | OUTPATIENT
Start: 2018-07-24 | End: 2018-07-24

## 2018-07-24 RX ORDER — SODIUM CHLORIDE 9 MG/ML
500 INJECTION INTRAMUSCULAR; INTRAVENOUS; SUBCUTANEOUS ONCE
Qty: 0 | Refills: 0 | Status: COMPLETED | OUTPATIENT
Start: 2018-07-24 | End: 2018-07-24

## 2018-07-24 RX ORDER — DEXAMETHASONE 0.5 MG/5ML
ELIXIR ORAL
Qty: 0 | Refills: 0 | Status: CANCELLED | OUTPATIENT
Start: 2018-07-25 | End: 2018-07-25

## 2018-07-24 RX ORDER — HYDROMORPHONE HYDROCHLORIDE 2 MG/ML
1 INJECTION INTRAMUSCULAR; INTRAVENOUS; SUBCUTANEOUS EVERY 4 HOURS
Qty: 0 | Refills: 0 | Status: DISCONTINUED | OUTPATIENT
Start: 2018-07-24 | End: 2018-07-25

## 2018-07-24 RX ORDER — HYDROMORPHONE HYDROCHLORIDE 2 MG/ML
0.5 INJECTION INTRAMUSCULAR; INTRAVENOUS; SUBCUTANEOUS
Qty: 0 | Refills: 0 | Status: DISCONTINUED | OUTPATIENT
Start: 2018-07-24 | End: 2018-07-24

## 2018-07-24 RX ORDER — OXYCODONE AND ACETAMINOPHEN 5; 325 MG/1; MG/1
2 TABLET ORAL EVERY 6 HOURS
Qty: 0 | Refills: 0 | Status: DISCONTINUED | OUTPATIENT
Start: 2018-07-24 | End: 2018-07-25

## 2018-07-24 RX ORDER — SODIUM CHLORIDE 9 MG/ML
500 INJECTION INTRAMUSCULAR; INTRAVENOUS; SUBCUTANEOUS ONCE
Qty: 0 | Refills: 0 | Status: COMPLETED | OUTPATIENT
Start: 2018-07-25 | End: 2018-07-25

## 2018-07-24 RX ORDER — ATORVASTATIN CALCIUM 80 MG/1
20 TABLET, FILM COATED ORAL AT BEDTIME
Qty: 0 | Refills: 0 | Status: DISCONTINUED | OUTPATIENT
Start: 2018-07-24 | End: 2018-07-25

## 2018-07-24 RX ORDER — DOCUSATE SODIUM 100 MG
100 CAPSULE ORAL THREE TIMES A DAY
Qty: 0 | Refills: 0 | Status: DISCONTINUED | OUTPATIENT
Start: 2018-07-24 | End: 2018-07-25

## 2018-07-24 RX ADMIN — ATORVASTATIN CALCIUM 20 MILLIGRAM(S): 80 TABLET, FILM COATED ORAL at 20:53

## 2018-07-24 RX ADMIN — PANTOPRAZOLE SODIUM 40 MILLIGRAM(S): 20 TABLET, DELAYED RELEASE ORAL at 15:58

## 2018-07-24 RX ADMIN — Medication 100 MILLIGRAM(S): at 15:58

## 2018-07-24 RX ADMIN — SODIUM CHLORIDE 3 MILLILITER(S): 9 INJECTION INTRAMUSCULAR; INTRAVENOUS; SUBCUTANEOUS at 06:53

## 2018-07-24 RX ADMIN — Medication 0.2 MILLILITER(S): at 06:53

## 2018-07-24 RX ADMIN — Medication 650 MILLIGRAM(S): at 17:43

## 2018-07-24 RX ADMIN — HYDROMORPHONE HYDROCHLORIDE 0.5 MILLIGRAM(S): 2 INJECTION INTRAMUSCULAR; INTRAVENOUS; SUBCUTANEOUS at 12:12

## 2018-07-24 RX ADMIN — OXYCODONE AND ACETAMINOPHEN 2 TABLET(S): 5; 325 TABLET ORAL at 22:41

## 2018-07-24 RX ADMIN — SODIUM CHLORIDE 1000 MILLILITER(S): 9 INJECTION INTRAMUSCULAR; INTRAVENOUS; SUBCUTANEOUS at 14:50

## 2018-07-24 RX ADMIN — Medication 100 MILLIGRAM(S): at 16:00

## 2018-07-24 RX ADMIN — SODIUM CHLORIDE 125 MILLILITER(S): 9 INJECTION INTRAMUSCULAR; INTRAVENOUS; SUBCUTANEOUS at 12:33

## 2018-07-24 RX ADMIN — OXYCODONE AND ACETAMINOPHEN 2 TABLET(S): 5; 325 TABLET ORAL at 20:53

## 2018-07-24 RX ADMIN — Medication 650 MILLIGRAM(S): at 17:13

## 2018-07-24 RX ADMIN — HYDROMORPHONE HYDROCHLORIDE 0.5 MILLIGRAM(S): 2 INJECTION INTRAMUSCULAR; INTRAVENOUS; SUBCUTANEOUS at 12:30

## 2018-07-24 RX ADMIN — Medication 5 MILLIGRAM(S): at 23:14

## 2018-07-24 RX ADMIN — SENNA PLUS 2 TABLET(S): 8.6 TABLET ORAL at 20:53

## 2018-07-24 RX ADMIN — HYDROMORPHONE HYDROCHLORIDE 0.5 MILLIGRAM(S): 2 INJECTION INTRAMUSCULAR; INTRAVENOUS; SUBCUTANEOUS at 11:30

## 2018-07-24 RX ADMIN — HYDROMORPHONE HYDROCHLORIDE 0.5 MILLIGRAM(S): 2 INJECTION INTRAMUSCULAR; INTRAVENOUS; SUBCUTANEOUS at 11:18

## 2018-07-24 RX ADMIN — Medication 100 MILLIGRAM(S): at 23:14

## 2018-07-24 RX ADMIN — Medication 100 MILLIGRAM(S): at 20:53

## 2018-07-24 NOTE — BRIEF OPERATIVE NOTE - PROCEDURE
<<-----Click on this checkbox to enter Procedure Discectomy of lumbar spine  07/24/2018  L4-5 left  Active  PGOLD2  Foraminotomy of lumbar spine  07/24/2018  bilateral L4-5  Active  PGOLD2

## 2018-07-24 NOTE — PHYSICAL THERAPY INITIAL EVALUATION ADULT - PLANNED THERAPY INTERVENTIONS, PT EVAL
transfer training/gait training/balance training/stairs: GOAL: Pt will be able to negotiate 12 steps +HR independently with reciprocal pattern in 2 weeks.

## 2018-07-24 NOTE — PHYSICAL THERAPY INITIAL EVALUATION ADULT - ADDITIONAL COMMENTS
Pt reports lives in a private house with her  with 4 steps to enter with 1 handrail and 12 steps to bedroom with 1 handrail. Prior to surgery, Pt was independent with all ADL's, transfers, ambulation in the home and community without the use of AD.

## 2018-07-24 NOTE — PHYSICAL THERAPY INITIAL EVALUATION ADULT - PERTINENT HX OF CURRENT PROBLEM, REHAB EVAL
64 y/o female with PMHx of HLD, GERD c/o worsening lower back pain for past 8 months radiating down left leg with associated numbness and tingling in left foot. s/p diagnostic imaging revealed disc herniation. Presents to PST for a scheduled L4-5 posterior bilateral lumbar laminectomy and discectomy on 7/24/2018.

## 2018-07-24 NOTE — PATIENT PROFILE ADULT. - PMH
Carpal tunnel syndrome  Bilateral : surgically treated '04 and '15  GERD (gastroesophageal reflux disease)    Hyperlipidemia  5 years  Multiparity    Other intervertebral disc displacement, lumbar region    Trigger finger  Left Middle Finger  Varicose vein of leg  1/2017:  Right Leg: surgically treated

## 2018-07-24 NOTE — CHART NOTE - NSCHARTNOTEFT_GEN_A_CORE
Patient Resting without complaints.    No Chest Pain, SOB, N/V.    Pre op s/sx : LLE radiculopathy    ; Post op, patient reports:   much improved    Exam:   Alert/Oriented, No Acute Distress  Cards: +S1/S2, RRR  Pulm: CTAB  BACK:          Dressing: [x ] clean/dry/intact  [ ] Other:           Sensation: [x ] intact to light touch          Motor exam: [x  ]                  [x ] Lower extremeity            PF          DF         EHL       FHL                                                                                               R        5/5        5/5        5/5       5/5                                                            L         5/5        5/5        5/5       5/5                                                                  Calves Soft/Non-tender bilaterally           [ x] warm well perfused; capillary refill <3 seconds              LABS: am labs      A/P :  63y Female s/p L4-5 Lami/Discectomy     -    Pain control  -    Taper  -    DVT ppx: SCDs       -    Physical Therapy  -    Weight bearing status: WBAT [x ]        PWB    [ ]     TTWB  [ ]      NWB  [ ]  -    Dispo: Anticipate home        ***See Above  Mitchell BERNARDO  Orthopedics  B: [phone]  S: [phone]

## 2018-07-25 ENCOUNTER — TRANSCRIPTION ENCOUNTER (OUTPATIENT)
Age: 63
End: 2018-07-25

## 2018-07-25 VITALS
OXYGEN SATURATION: 96 % | DIASTOLIC BLOOD PRESSURE: 71 MMHG | HEART RATE: 85 BPM | TEMPERATURE: 99 F | RESPIRATION RATE: 16 BRPM | SYSTOLIC BLOOD PRESSURE: 112 MMHG

## 2018-07-25 LAB
ANION GAP SERPL CALC-SCNC: 12 MMOL/L — SIGNIFICANT CHANGE UP (ref 5–17)
BASOPHILS # BLD AUTO: 0 K/UL — SIGNIFICANT CHANGE UP (ref 0–0.2)
BASOPHILS NFR BLD AUTO: 0 % — SIGNIFICANT CHANGE UP (ref 0–2)
BUN SERPL-MCNC: 12 MG/DL — SIGNIFICANT CHANGE UP (ref 7–23)
CALCIUM SERPL-MCNC: 8.7 MG/DL — SIGNIFICANT CHANGE UP (ref 8.4–10.5)
CHLORIDE SERPL-SCNC: 105 MMOL/L — SIGNIFICANT CHANGE UP (ref 96–108)
CO2 SERPL-SCNC: 24 MMOL/L — SIGNIFICANT CHANGE UP (ref 22–31)
CREAT SERPL-MCNC: 0.7 MG/DL — SIGNIFICANT CHANGE UP (ref 0.5–1.3)
EOSINOPHIL # BLD AUTO: 0 K/UL — SIGNIFICANT CHANGE UP (ref 0–0.5)
EOSINOPHIL NFR BLD AUTO: 0 % — SIGNIFICANT CHANGE UP (ref 0–6)
GLUCOSE SERPL-MCNC: 175 MG/DL — HIGH (ref 70–99)
HCT VFR BLD CALC: 37.7 % — SIGNIFICANT CHANGE UP (ref 34.5–45)
HGB BLD-MCNC: 12.1 G/DL — SIGNIFICANT CHANGE UP (ref 11.5–15.5)
IMM GRANULOCYTES NFR BLD AUTO: 0.3 % — SIGNIFICANT CHANGE UP (ref 0–1.5)
LYMPHOCYTES # BLD AUTO: 0.86 K/UL — LOW (ref 1–3.3)
LYMPHOCYTES # BLD AUTO: 8.1 % — LOW (ref 13–44)
MCHC RBC-ENTMCNC: 29.6 PG — SIGNIFICANT CHANGE UP (ref 27–34)
MCHC RBC-ENTMCNC: 32.1 GM/DL — SIGNIFICANT CHANGE UP (ref 32–36)
MCV RBC AUTO: 92.2 FL — SIGNIFICANT CHANGE UP (ref 80–100)
MONOCYTES # BLD AUTO: 0.72 K/UL — SIGNIFICANT CHANGE UP (ref 0–0.9)
MONOCYTES NFR BLD AUTO: 6.8 % — SIGNIFICANT CHANGE UP (ref 2–14)
NEUTROPHILS # BLD AUTO: 9.04 K/UL — HIGH (ref 1.8–7.4)
NEUTROPHILS NFR BLD AUTO: 84.8 % — HIGH (ref 43–77)
PLATELET # BLD AUTO: 286 K/UL — SIGNIFICANT CHANGE UP (ref 150–400)
POTASSIUM SERPL-MCNC: 4.3 MMOL/L — SIGNIFICANT CHANGE UP (ref 3.5–5.3)
POTASSIUM SERPL-SCNC: 4.3 MMOL/L — SIGNIFICANT CHANGE UP (ref 3.5–5.3)
RBC # BLD: 4.09 M/UL — SIGNIFICANT CHANGE UP (ref 3.8–5.2)
RBC # FLD: 13.9 % — SIGNIFICANT CHANGE UP (ref 10.3–14.5)
SODIUM SERPL-SCNC: 141 MMOL/L — SIGNIFICANT CHANGE UP (ref 135–145)
WBC # BLD: 10.65 K/UL — HIGH (ref 3.8–10.5)
WBC # FLD AUTO: 10.65 K/UL — HIGH (ref 3.8–10.5)

## 2018-07-25 RX ORDER — DEXAMETHASONE 0.5 MG/5ML
1 ELIXIR ORAL
Qty: 0 | Refills: 0 | COMMUNITY
Start: 2018-07-25

## 2018-07-25 RX ORDER — ASPIRIN/CALCIUM CARB/MAGNESIUM 324 MG
1 TABLET ORAL
Qty: 0 | Refills: 0 | COMMUNITY

## 2018-07-25 RX ORDER — DOCUSATE SODIUM 100 MG
1 CAPSULE ORAL
Qty: 0 | Refills: 0 | DISCHARGE
Start: 2018-07-25

## 2018-07-25 RX ORDER — ACETAMINOPHEN 500 MG
2 TABLET ORAL
Qty: 0 | Refills: 0 | DISCHARGE
Start: 2018-07-25

## 2018-07-25 RX ORDER — DEXAMETHASONE 0.5 MG/5ML
1 ELIXIR ORAL
Qty: 28 | Refills: 0
Start: 2018-07-25

## 2018-07-25 RX ORDER — PANTOPRAZOLE SODIUM 20 MG/1
1 TABLET, DELAYED RELEASE ORAL
Qty: 30 | Refills: 0 | OUTPATIENT
Start: 2018-07-25 | End: 2018-08-23

## 2018-07-25 RX ADMIN — Medication 5 MILLIGRAM(S): at 07:03

## 2018-07-25 RX ADMIN — PANTOPRAZOLE SODIUM 40 MILLIGRAM(S): 20 TABLET, DELAYED RELEASE ORAL at 06:24

## 2018-07-25 RX ADMIN — OXYCODONE AND ACETAMINOPHEN 2 TABLET(S): 5; 325 TABLET ORAL at 12:01

## 2018-07-25 RX ADMIN — Medication 100 MILLIGRAM(S): at 07:03

## 2018-07-25 RX ADMIN — Medication 5 MILLIGRAM(S): at 12:01

## 2018-07-25 RX ADMIN — OXYCODONE AND ACETAMINOPHEN 2 TABLET(S): 5; 325 TABLET ORAL at 12:31

## 2018-07-25 RX ADMIN — Medication 100 MILLIGRAM(S): at 13:05

## 2018-07-25 RX ADMIN — SODIUM CHLORIDE 1000 MILLILITER(S): 9 INJECTION INTRAMUSCULAR; INTRAVENOUS; SUBCUTANEOUS at 07:45

## 2018-07-25 NOTE — DISCHARGE NOTE ADULT - NS AS ACTIVITY OBS
Do not make important decisions/Walking-Indoors allowed/Walking-Outdoors allowed/Do not drive or operate machinery/No Heavy lifting/straining/Sponge bathing til discussed with Dr. Be/Stairs allowed

## 2018-07-25 NOTE — DISCHARGE NOTE ADULT - MEDICATION SUMMARY - MEDICATIONS TO STOP TAKING
I will STOP taking the medications listed below when I get home from the hospital:    aspirin 81 mg oral tablet  -- 1 tab(s) by mouth once a day (bedtime), last dose 7/17 per Dr. Be

## 2018-07-25 NOTE — DISCHARGE NOTE ADULT - MEDICATION SUMMARY - MEDICATIONS TO TAKE
I will START or STAY ON the medications listed below when I get home from the hospital:    dexamethasone 1 mg oral tablet  -- Steroid taper 5mgs po q 6 hrs x 2 doses. 3 mgs po q 6 hrs x 4 doses, then 1 mg po q6 hrs x 4 doses then stop  -- It is very important that you take or use this exactly as directed.  Do not skip doses or discontinue unless directed by your doctor.  Obtain medical advice before taking any non-prescription drugs as some may affect the action of this medication.  Take with food or milk.    -- Indication: For Anti inflammatory agent    oxyCODONE-acetaminophen 5 mg-325 mg oral tablet  -- 1 or 2 tab(s) by mouth every 4 hours, As needed, for Pain MDD:8  -- Indication: For Pain medication    acetaminophen 325 mg oral tablet  -- 2 tab(s) by mouth every 6 hours, As needed, For Temp greater than 38 C (100.4 F)  -- Indication: For Temps    acetaminophen 325 mg oral tablet  -- 2 tab(s) by mouth every 6 hours, As needed, Mild Pain (1 - 3)  -- Indication: For pain    atorvastatin 20 mg oral tablet  -- 1 tab(s) by mouth once a day (at bedtime)  -- Indication: For antihyperlipidemia agent    docusate sodium 100 mg oral capsule  -- 1 cap(s) by mouth 3 times a day  -- Indication: For stool softener    PriLOSEC OTC 20 mg oral delayed release tablet  -- 1 tab(s) by mouth once a day (at bedtime)  -- Indication: For antidyspepsia agent

## 2018-07-25 NOTE — PROGRESS NOTE ADULT - ASSESSMENT
Impression: Stable       Plan:   Continue present treatment                 Out of bed, ambulate                  Physical therapy evaluation                  Continue to monitor    Ryne Jones PA-C  Orthopaedic Surgery  Team pager [phone]  [phone]

## 2018-07-25 NOTE — DISCHARGE NOTE ADULT - CARE PLAN
Principal Discharge DX:	Other intervertebral disc displacement, lumbar region  Goal:	Pain relief, improvement with activities of daily living  Assessment and plan of treatment:	Please call Dr. Be's office within next few days to schedule a follow up appointment about 14 days after surgery. Dressing will be changed during office visit. Out of bed, ambulate, weight bearing as tolerated- Physical therapy to assist with exercise and help increase endurance Principal Discharge DX:	Other intervertebral disc displacement, lumbar region  Goal:	Pain relief, improvement with activities of daily living  Assessment and plan of treatment:	Please call Dr. Be's office within next few days to schedule a follow up appointment about 7 days after surgery. Dressing will be changed during office visit. Out of bed, ambulate, weight bearing as tolerated- Physical therapy to assist with exercise and help increase endurance

## 2018-07-25 NOTE — DISCHARGE NOTE ADULT - PLAN OF CARE
Pain relief, improvement with activities of daily living Please call Dr. Be's office within next few days to schedule a follow up appointment about 14 days after surgery. Dressing will be changed during office visit. Out of bed, ambulate, weight bearing as tolerated- Physical therapy to assist with exercise and help increase endurance Please call Dr. Be's office within next few days to schedule a follow up appointment about 7 days after surgery. Dressing will be changed during office visit. Out of bed, ambulate, weight bearing as tolerated- Physical therapy to assist with exercise and help increase endurance

## 2018-07-25 NOTE — DISCHARGE NOTE ADULT - HOSPITAL COURSE
History of Present Illness		  64 y/o female with PMHx of HLD, GERD c/o worsening lower back pain for past 8 months radiating down left leg with associated numbness and tingling in left foot. s/p diagnostic imaging revealed disc herniation. Presents to PST for a scheduled L4-5 posterior bilateral lumbar laminectomy and discectomy on 7/24/2018.    Allergies/Medications:   Allergies:        Allergies:  	No Known Allergies:     Home Medications:   * Patient Currently Takes Medications as of 20-Jul-2018 14:39 documented in Structured Notes  · 	atorvastatin 20 mg oral tablet: Last Dose Taken:  , 1 tab(s) orally once a day (at bedtime)  · 	PriLOSEC OTC 20 mg oral delayed release tablet: Last Dose Taken:  , 1 tab(s) orally once a day (at bedtime)  · 	aspirin 81 mg oral tablet: Last Dose Taken:  , 1 tab(s) orally once a day (bedtime), last dose 7/17 per Dr. Be    PMH/PSH//SH:    Past Medical History:  Carpal tunnel syndrome  Bilateral : surgically treated '04 and '15  GERD (gastroesophageal reflux disease)    Hyperlipidemia  5 years  Multiparity    Other intervertebral disc displacement, lumbar region    Trigger finger  Left Middle Finger  Varicose vein of leg  1/2017:  Right Leg: surgically treated.     Past Surgical History:  H/O tubal ligation  ' 1985  H/O varicose vein stripping  1/2017, 6/2018, Right Leg  S/p bilateral carpal tunnel release  ' 2004:  Left    ' 2015:  Right  Status post colonoscopy  ' 16   Negative  Trigger finger, left middle finger  release, 7/2017.    7/24/18- Patient presents to the hospital for elective surgery, underwent a Lumbar Laminectomy discectomy L4-L5 - tolerated procedure without complications.  Patient was evaluated by Physical therapy whom recommended home for discharge plan.  Patient stable for discharge when cleared by PT.

## 2018-07-25 NOTE — DISCHARGE NOTE ADULT - PATIENT PORTAL LINK FT
You can access the [facility] Patient Portal, offered by [facility], by registering with the following website: [email or web address]

## 2018-07-25 NOTE — PROGRESS NOTE ADULT - SUBJECTIVE AND OBJECTIVE BOX
ORTHO  Patient is a 63y old  Female who presents with a chief complaint of "L4-5 laminectomy, discectomy" (24 Jul 2018 06:33)    Pt. resting without complaint    VS-  T(C): 36.7 (07-25-18 @ 05:17), Max: 36.9 (07-24-18 @ 06:31)  HR: 78 (07-25-18 @ 05:17) (68 - 98)  BP: 101/64 (07-25-18 @ 05:17) (101/64 - 130/76)  RR: 18 (07-25-18 @ 05:17) (15 - 18)  SpO2: 95% (07-25-18 @ 05:17) (93% - 100%)  Wt(kg): --    M.S.  A&O  Lower back dressing C/D/I  Neuro-              Motor- (+)Ankle and EHL- DF/PF 5+/5              Sensation-grossly intact to light touch              Calves- soft, nontender- PAS

## 2018-07-26 LAB — SURGICAL PATHOLOGY STUDY: SIGNIFICANT CHANGE UP

## 2018-07-31 DIAGNOSIS — Z71.89 OTHER SPECIFIED COUNSELING: ICD-10-CM

## 2018-08-02 ENCOUNTER — APPOINTMENT (OUTPATIENT)
Dept: ORTHOPEDIC SURGERY | Facility: CLINIC | Age: 63
End: 2018-08-02

## 2018-08-03 ENCOUNTER — APPOINTMENT (OUTPATIENT)
Dept: ORTHOPEDIC SURGERY | Facility: CLINIC | Age: 63
End: 2018-08-03
Payer: MEDICAID

## 2018-08-03 ENCOUNTER — CHART COPY (OUTPATIENT)
Age: 63
End: 2018-08-03

## 2018-08-03 PROCEDURE — 72100 X-RAY EXAM L-S SPINE 2/3 VWS: CPT

## 2018-08-03 PROCEDURE — 99024 POSTOP FOLLOW-UP VISIT: CPT

## 2018-08-17 ENCOUNTER — APPOINTMENT (OUTPATIENT)
Dept: ORTHOPEDIC SURGERY | Facility: CLINIC | Age: 63
End: 2018-08-17
Payer: MEDICAID

## 2018-08-17 ENCOUNTER — OTHER (OUTPATIENT)
Age: 63
End: 2018-08-17

## 2018-08-17 PROCEDURE — 99024 POSTOP FOLLOW-UP VISIT: CPT

## 2018-09-07 ENCOUNTER — APPOINTMENT (OUTPATIENT)
Dept: ORTHOPEDIC SURGERY | Facility: CLINIC | Age: 63
End: 2018-09-07
Payer: MEDICAID

## 2018-09-07 VITALS
WEIGHT: 143 LBS | BODY MASS INDEX: 27 KG/M2 | HEIGHT: 61 IN | SYSTOLIC BLOOD PRESSURE: 125 MMHG | DIASTOLIC BLOOD PRESSURE: 81 MMHG | HEART RATE: 71 BPM

## 2018-09-07 PROCEDURE — 99024 POSTOP FOLLOW-UP VISIT: CPT

## 2018-10-09 ENCOUNTER — APPOINTMENT (OUTPATIENT)
Dept: INTERNAL MEDICINE | Facility: CLINIC | Age: 63
End: 2018-10-09
Payer: MEDICAID

## 2018-10-09 VITALS
TEMPERATURE: 98.7 F | HEIGHT: 61 IN | OXYGEN SATURATION: 98 % | WEIGHT: 150 LBS | BODY MASS INDEX: 28.32 KG/M2 | SYSTOLIC BLOOD PRESSURE: 100 MMHG | HEART RATE: 72 BPM | DIASTOLIC BLOOD PRESSURE: 60 MMHG

## 2018-10-09 PROCEDURE — 90686 IIV4 VACC NO PRSV 0.5 ML IM: CPT

## 2018-10-09 PROCEDURE — 99213 OFFICE O/P EST LOW 20 MIN: CPT | Mod: 25

## 2018-10-09 PROCEDURE — G0008: CPT

## 2018-10-09 NOTE — ASSESSMENT
[FreeTextEntry1] : 63F with acid reflux, HLD comes in complaining of stomach pain\par - abdominal pain: differential diagnosis include gall stone, gastric ulcer, malignancy, etc\par US of abdomen\par check hepatitis C, lipase, and helico bactor\par if no improvement in symptoms in 2 months, recommend adding EGD to colonoscopy\par - health maintenance: flu shot today\par has annual physical scheduled on 1/11/2019

## 2018-10-09 NOTE — PHYSICAL EXAM
[No Acute Distress] : no acute distress [Well Nourished] : well nourished [Well Developed] : well developed [Well-Appearing] : well-appearing [Normal Sclera/Conjunctiva] : normal sclera/conjunctiva [Normal Outer Ear/Nose] : the outer ears and nose were normal in appearance [No Respiratory Distress] : no respiratory distress  [Clear to Auscultation] : lungs were clear to auscultation bilaterally [No Accessory Muscle Use] : no accessory muscle use [Normal Rate] : normal rate  [Regular Rhythm] : with a regular rhythm [Normal S1, S2] : normal S1 and S2 [No Murmur] : no murmur heard [Soft] : abdomen soft [Non-distended] : non-distended [Normal Bowel Sounds] : normal bowel sounds [de-identified] : mild tenderness to palpation at epigastric and right lower quadrant

## 2018-10-09 NOTE — REVIEW OF SYSTEMS
[Recent Change In Weight] : ~T recent weight change [Abdominal Pain] : abdominal pain [Back Pain] : back pain [Fever] : no fever [Chills] : no chills [Chest Pain] : no chest pain [Palpitations] : no palpitations [Shortness Of Breath] : no shortness of breath [Dyspnea on Exertion] : no dyspnea on exertion [Constipation] : no constipation [Diarrhea] : diarrhea [Dysuria] : no dysuria [Hematuria] : no hematuria [Joint Pain] : no joint pain [Easy Bleeding] : no easy bleeding [Easy Bruising] : no easy bruising

## 2018-10-09 NOTE — HISTORY OF PRESENT ILLNESS
[FreeTextEntry8] : 63F with acid reflux, HLD comes in complaining of stomach pain\par has been having stomach pain x 1 month, usually right after she eats. the pain is located at epigastric area and left lower quadrant. she is worried because her mom  of stomach cancer at age 52. had EGD about 4 years ago for acid reflux\par never tested for Helicobacter. colonoscopy scheduled for 2019 (had to wait because of recent surgeries)\par gained 6 lb recently\par denies having constipation. eats healthy, mostly vegetables\par retired

## 2018-10-10 LAB
HCV AB SER QL: NONREACTIVE
HCV S/CO RATIO: 0.23 S/CO
LPL SERPL-CCNC: 55 U/L

## 2018-10-11 ENCOUNTER — CLINICAL ADVICE (OUTPATIENT)
Age: 63
End: 2018-10-11

## 2018-10-11 LAB
H PYLORI AB SER-ACNC: 11.5 UNITS
H PYLORI IGA SER-ACNC: 32.5 UNITS

## 2018-10-12 ENCOUNTER — APPOINTMENT (OUTPATIENT)
Dept: ORTHOPEDIC SURGERY | Facility: CLINIC | Age: 63
End: 2018-10-12
Payer: MEDICAID

## 2018-10-12 ENCOUNTER — MEDICATION RENEWAL (OUTPATIENT)
Age: 63
End: 2018-10-12

## 2018-10-12 PROCEDURE — 99024 POSTOP FOLLOW-UP VISIT: CPT

## 2018-10-16 ENCOUNTER — FORM ENCOUNTER (OUTPATIENT)
Age: 63
End: 2018-10-16

## 2018-10-17 ENCOUNTER — OUTPATIENT (OUTPATIENT)
Dept: OUTPATIENT SERVICES | Facility: HOSPITAL | Age: 63
LOS: 1 days | End: 2018-10-17
Payer: MEDICAID

## 2018-10-17 ENCOUNTER — APPOINTMENT (OUTPATIENT)
Dept: ULTRASOUND IMAGING | Facility: CLINIC | Age: 63
End: 2018-10-17
Payer: MEDICAID

## 2018-10-17 DIAGNOSIS — Z98.890 OTHER SPECIFIED POSTPROCEDURAL STATES: Chronic | ICD-10-CM

## 2018-10-17 DIAGNOSIS — M65.332 TRIGGER FINGER, LEFT MIDDLE FINGER: Chronic | ICD-10-CM

## 2018-10-17 DIAGNOSIS — Z98.51 TUBAL LIGATION STATUS: Chronic | ICD-10-CM

## 2018-10-17 DIAGNOSIS — Z00.8 ENCOUNTER FOR OTHER GENERAL EXAMINATION: ICD-10-CM

## 2018-10-17 PROCEDURE — 76700 US EXAM ABDOM COMPLETE: CPT

## 2018-10-17 PROCEDURE — 76700 US EXAM ABDOM COMPLETE: CPT | Mod: 26

## 2018-11-09 ENCOUNTER — APPOINTMENT (OUTPATIENT)
Dept: INTERNAL MEDICINE | Facility: CLINIC | Age: 63
End: 2018-11-09
Payer: MEDICAID

## 2018-11-09 VITALS
WEIGHT: 148 LBS | DIASTOLIC BLOOD PRESSURE: 70 MMHG | SYSTOLIC BLOOD PRESSURE: 115 MMHG | HEIGHT: 61 IN | OXYGEN SATURATION: 98 % | HEART RATE: 67 BPM | TEMPERATURE: 98.1 F | BODY MASS INDEX: 27.94 KG/M2

## 2018-11-09 PROCEDURE — 99213 OFFICE O/P EST LOW 20 MIN: CPT

## 2018-11-09 RX ORDER — CLARITHROMYCIN 500 MG/1
500 TABLET, FILM COATED ORAL
Qty: 28 | Refills: 0 | Status: COMPLETED | COMMUNITY
Start: 2018-10-11 | End: 2018-11-09

## 2018-11-09 RX ORDER — PANTOPRAZOLE 20 MG/1
20 TABLET, DELAYED RELEASE ORAL TWICE DAILY
Qty: 28 | Refills: 0 | Status: COMPLETED | COMMUNITY
Start: 2018-10-11 | End: 2018-11-09

## 2018-11-09 RX ORDER — AMOXICILLIN 500 MG/1
500 TABLET, FILM COATED ORAL
Qty: 56 | Refills: 0 | Status: COMPLETED | COMMUNITY
Start: 2018-10-11 | End: 2018-11-09

## 2018-11-09 NOTE — PLAN
[FreeTextEntry1] : \par #1 s/p triple therapy for h. pylori\par Symptoms resolved\par Repeat blood work today\par GI follow up scheduled\par

## 2018-11-09 NOTE — PHYSICAL EXAM
[No Acute Distress] : no acute distress [Well-Appearing] : well-appearing [No Respiratory Distress] : no respiratory distress  [Clear to Auscultation] : lungs were clear to auscultation bilaterally [No Accessory Muscle Use] : no accessory muscle use [Normal Rate] : normal rate  [Regular Rhythm] : with a regular rhythm [Normal S1, S2] : normal S1 and S2 [No Murmur] : no murmur heard [Soft] : abdomen soft [Non Tender] : non-tender [Non-distended] : non-distended [No Masses] : no abdominal mass palpated [No HSM] : no HSM [Normal Bowel Sounds] : normal bowel sounds

## 2018-11-09 NOTE — HISTORY OF PRESENT ILLNESS
[de-identified] : Presents for follow up of h. pylori, was advised to repeat blood work in 1 month.  \par Seen in the office 1 month ago by another provider with c/o abdominal discomfort.  Patient had positive H. Pylori IgA, normal abdominal US.  Completed 2 weeks of triple therapy.  \par Feeling well abdominal discomfort resolved.  No heartburn, no reflux, eating well, no dysphagia, normal appetite.  \par Scheduled for EGD (follow up of gastritis) and colonoscopy (due for routine screening) in January.  \par

## 2018-11-12 ENCOUNTER — RESULT REVIEW (OUTPATIENT)
Age: 63
End: 2018-11-12

## 2018-11-12 LAB
H PYLORI AB SER-ACNC: 13.6 UNITS
H PYLORI IGA SER-ACNC: 36.6 UNITS

## 2018-11-19 LAB — H PYLORI AG STL QL: NOT DETECTED

## 2018-12-13 ENCOUNTER — APPOINTMENT (OUTPATIENT)
Dept: ORTHOPEDIC SURGERY | Facility: CLINIC | Age: 63
End: 2018-12-13
Payer: MEDICAID

## 2018-12-13 VITALS
BODY MASS INDEX: 27.56 KG/M2 | HEIGHT: 61 IN | WEIGHT: 146 LBS | DIASTOLIC BLOOD PRESSURE: 92 MMHG | HEART RATE: 60 BPM | SYSTOLIC BLOOD PRESSURE: 134 MMHG

## 2018-12-13 PROCEDURE — 99214 OFFICE O/P EST MOD 30 MIN: CPT

## 2018-12-17 ENCOUNTER — RX RENEWAL (OUTPATIENT)
Age: 63
End: 2018-12-17

## 2018-12-26 PROCEDURE — 85027 COMPLETE CBC AUTOMATED: CPT

## 2018-12-26 PROCEDURE — 80048 BASIC METABOLIC PNL TOTAL CA: CPT

## 2018-12-26 PROCEDURE — 97161 PT EVAL LOW COMPLEX 20 MIN: CPT

## 2018-12-26 PROCEDURE — 72020 X-RAY EXAM OF SPINE 1 VIEW: CPT

## 2018-12-26 PROCEDURE — 88304 TISSUE EXAM BY PATHOLOGIST: CPT

## 2018-12-26 PROCEDURE — 97110 THERAPEUTIC EXERCISES: CPT

## 2018-12-26 PROCEDURE — C1889: CPT

## 2018-12-26 PROCEDURE — 97116 GAIT TRAINING THERAPY: CPT

## 2018-12-26 PROCEDURE — 88311 DECALCIFY TISSUE: CPT

## 2019-01-02 ENCOUNTER — APPOINTMENT (OUTPATIENT)
Dept: GASTROENTEROLOGY | Facility: CLINIC | Age: 64
End: 2019-01-02
Payer: MEDICAID

## 2019-01-02 VITALS
SYSTOLIC BLOOD PRESSURE: 120 MMHG | WEIGHT: 148 LBS | RESPIRATION RATE: 16 BRPM | DIASTOLIC BLOOD PRESSURE: 72 MMHG | HEART RATE: 87 BPM | BODY MASS INDEX: 27.94 KG/M2 | OXYGEN SATURATION: 97 % | TEMPERATURE: 98.2 F | HEIGHT: 61 IN

## 2019-01-02 DIAGNOSIS — Z12.11 ENCOUNTER FOR SCREENING FOR MALIGNANT NEOPLASM OF COLON: ICD-10-CM

## 2019-01-02 PROCEDURE — 99213 OFFICE O/P EST LOW 20 MIN: CPT

## 2019-01-11 ENCOUNTER — APPOINTMENT (OUTPATIENT)
Dept: INTERNAL MEDICINE | Facility: CLINIC | Age: 64
End: 2019-01-11
Payer: MEDICAID

## 2019-01-11 VITALS
HEIGHT: 61 IN | DIASTOLIC BLOOD PRESSURE: 60 MMHG | SYSTOLIC BLOOD PRESSURE: 110 MMHG | TEMPERATURE: 98.6 F | WEIGHT: 152 LBS | BODY MASS INDEX: 28.7 KG/M2 | HEART RATE: 80 BPM | OXYGEN SATURATION: 97 %

## 2019-01-11 PROCEDURE — 99396 PREV VISIT EST AGE 40-64: CPT

## 2019-01-11 RX ORDER — ASPIRIN 81 MG
81 TABLET, DELAYED RELEASE (ENTERIC COATED) ORAL
Refills: 0 | Status: DISCONTINUED | COMMUNITY
End: 2019-01-11

## 2019-01-11 RX ORDER — POLYETHYLENE GLYCOL 3350 17 G/17G
17 POWDER, FOR SOLUTION ORAL DAILY
Qty: 30 | Refills: 1 | Status: DISCONTINUED | COMMUNITY
Start: 2019-01-02 | End: 2019-01-11

## 2019-01-11 RX ORDER — DOCUSATE SODIUM 100 MG/1
100 CAPSULE ORAL
Qty: 60 | Refills: 3 | Status: DISCONTINUED | COMMUNITY
Start: 2018-08-17 | End: 2019-01-11

## 2019-01-11 NOTE — PHYSICAL EXAM
[No Acute Distress] : no acute distress [Well-Appearing] : well-appearing [Normal Sclera/Conjunctiva] : normal sclera/conjunctiva [Normal Oropharynx] : the oropharynx was normal [Supple] : supple [No Respiratory Distress] : no respiratory distress  [Clear to Auscultation] : lungs were clear to auscultation bilaterally [Normal Rate] : normal rate  [Regular Rhythm] : with a regular rhythm [Normal S1, S2] : normal S1 and S2 [No Murmur] : no murmur heard [No Edema] : there was no peripheral edema [Soft] : abdomen soft [Non Tender] : non-tender [Normal Supraclavicular Nodes] : no supraclavicular lymphadenopathy [Normal Anterior Cervical Nodes] : no anterior cervical lymphadenopathy [No Rash] : no rash [Normal Gait] : normal gait [No Focal Deficits] : no focal deficits [Normal Affect] : the affect was normal [Normal Mood] : the mood was normal

## 2019-01-11 NOTE — HISTORY OF PRESENT ILLNESS
[FreeTextEntry1] : Here for CPE [de-identified] : s/p abx therapy for hpylori tx, continues to have symptosm, scheduled for EGD/CLS in February with Dr. Woods\par postop laminectomy 7/2018\par \par Had mammo 12/24/18, has additional images scheduled in 2 months.  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 \par \par C/o cough, dry, worse in the morning, uses cough medicine regularly, only feels SOB with coughing.  no fevers.  Cough started 4 days ago.  has a hx of developing bronchitis with similar symptoms.  no fevrs, no chills, no myalgias.  no sore throat.  \par \par Adherent to atorvastatin,  has been taking asa for some years for primary prevention.  \par Still has stomach symptoms off and on, takes PPI daily.  \par \par hcm:\par Breast Cancer see above\par Colon Cancer 18 Dec 2015 *s Colonoscopy   (inadequate prep)\par HCV Testing 09 Oct 2018 HCV Interpretation: Nonreactive \par Influenza 09 Oct 2018 Fluzone Quadrivalent 0.5 ML IM(Single-Dose Vial)   \par Td/Tdap 21 May 2015 Tdap   \par Zoster 31 Oct 2016 Zoster (Zostavax) \par \par

## 2019-01-11 NOTE — ASSESSMENT
[FreeTextEntry1] : 62 y/o female for CPE\par \par May stop asa as benefit for primary prophylaxis minimal and risk of upset GI tract higher.\par \par GERD: f/u with GI as noted \par \par Recent flu exposure:  Tamiflu rx.\par \par HCM: mammo f/u images scheduled as is CLS/EGD\par labs as noted.  \par \par COugh: likley early viral URI, monitor for now.

## 2019-01-16 LAB
ALBUMIN SERPL ELPH-MCNC: 4.7 G/DL
ALP BLD-CCNC: 117 U/L
ALT SERPL-CCNC: 12 U/L
ANION GAP SERPL CALC-SCNC: 15 MMOL/L
AST SERPL-CCNC: 19 U/L
BASOPHILS # BLD AUTO: 0.04 K/UL
BASOPHILS NFR BLD AUTO: 0.7 %
BILIRUB SERPL-MCNC: 0.4 MG/DL
BUN SERPL-MCNC: 20 MG/DL
CALCIUM SERPL-MCNC: 9.8 MG/DL
CHLORIDE SERPL-SCNC: 105 MMOL/L
CHOLEST SERPL-MCNC: 178 MG/DL
CHOLEST/HDLC SERPL: 3.1 RATIO
CO2 SERPL-SCNC: 26 MMOL/L
CREAT SERPL-MCNC: 0.89 MG/DL
EOSINOPHIL # BLD AUTO: 0.5 K/UL
EOSINOPHIL NFR BLD AUTO: 8.8 %
GLUCOSE SERPL-MCNC: 92 MG/DL
HBA1C MFR BLD HPLC: 6 %
HCT VFR BLD CALC: 41.5 %
HDLC SERPL-MCNC: 58 MG/DL
HGB BLD-MCNC: 12.8 G/DL
IMM GRANULOCYTES NFR BLD AUTO: 0.2 %
LDLC SERPL CALC-MCNC: 97 MG/DL
LYMPHOCYTES # BLD AUTO: 2.17 K/UL
LYMPHOCYTES NFR BLD AUTO: 38 %
MAN DIFF?: NORMAL
MCHC RBC-ENTMCNC: 29 PG
MCHC RBC-ENTMCNC: 30.8 GM/DL
MCV RBC AUTO: 94.1 FL
MONOCYTES # BLD AUTO: 0.51 K/UL
MONOCYTES NFR BLD AUTO: 8.9 %
NEUTROPHILS # BLD AUTO: 2.48 K/UL
NEUTROPHILS NFR BLD AUTO: 43.4 %
PLATELET # BLD AUTO: 249 K/UL
POTASSIUM SERPL-SCNC: 4.5 MMOL/L
PROT SERPL-MCNC: 7.4 G/DL
RBC # BLD: 4.41 M/UL
RBC # FLD: 13.7 %
SODIUM SERPL-SCNC: 146 MMOL/L
TRIGL SERPL-MCNC: 116 MG/DL
TSH SERPL-ACNC: 2.9 UIU/ML
WBC # FLD AUTO: 5.71 K/UL

## 2019-01-23 ENCOUNTER — OTHER (OUTPATIENT)
Age: 64
End: 2019-01-23

## 2019-02-14 ENCOUNTER — RX RENEWAL (OUTPATIENT)
Age: 64
End: 2019-02-14

## 2019-02-15 ENCOUNTER — APPOINTMENT (OUTPATIENT)
Dept: GASTROENTEROLOGY | Facility: AMBULATORY MEDICAL SERVICES | Age: 64
End: 2019-02-15
Payer: MEDICAID

## 2019-02-15 PROCEDURE — 43239 EGD BIOPSY SINGLE/MULTIPLE: CPT

## 2019-02-15 PROCEDURE — 45385 COLONOSCOPY W/LESION REMOVAL: CPT | Mod: 33

## 2019-02-25 ENCOUNTER — CLINICAL ADVICE (OUTPATIENT)
Age: 64
End: 2019-02-25

## 2019-04-22 ENCOUNTER — RX RENEWAL (OUTPATIENT)
Age: 64
End: 2019-04-22

## 2019-04-22 NOTE — ASU PREOP CHECKLIST - VIA
*************************************  NEUROLOGY FOLLOW UP NOTE  **************************************    RON FOSTER  Female  MRN-[id number]    Subjective: patient had waxing waning mental status. Reported headache.     Vital Signs Last 24 Hrs  T(C): 36.9 (22 Apr 2019 11:00), Max: 37.2 (21 Apr 2019 15:00)  T(F): 98.5 (22 Apr 2019 11:00), Max: 98.9 (21 Apr 2019 15:00)  HR: 73 (22 Apr 2019 13:26) (70 - 119)  BP: 122/49 (22 Apr 2019 13:26) (92/41 - 138/58)  BP(mean): 70 (22 Apr 2019 13:26) (57 - 77)  RR: 21 (22 Apr 2019 13:26) (15 - 33)  SpO2: 97% (22 Apr 2019 13:26) (91% - 100%)    MEDICATIONS  (STANDING):  acyclovir IVPB 1000 milliGRAM(s) IV Intermittent every 8 hours  ALBUTerol/ipratropium for Nebulization 3 milliLiter(s) Nebulizer every 6 hours  amLODIPine   Tablet 10 milliGRAM(s) Oral daily  chlorhexidine 0.12% Liquid 15 milliLiter(s) Oral Mucosa two times a day  chlorhexidine 4% Liquid 1 Application(s) Topical <User Schedule>  dextrose 5%. 1000 milliLiter(s) (50 mL/Hr) IV Continuous <Continuous>  dextrose 50% Injectable 12.5 Gram(s) IV Push once  dextrose 50% Injectable 25 Gram(s) IV Push once  dextrose 50% Injectable 25 Gram(s) IV Push once  enoxaparin Injectable 40 milliGRAM(s) SubCutaneous <User Schedule>  hydrALAZINE 100 milliGRAM(s) Oral every 8 hours  insulin lispro (HumaLOG) corrective regimen sliding scale   SubCutaneous every 6 hours  insulin NPH human recombinant 7 Unit(s) SubCutaneous every 6 hours  labetalol 200 milliGRAM(s) Oral three times a day  lactated ringers. 1000 milliLiter(s) (75 mL/Hr) IV Continuous <Continuous>  levETIRAcetam  IVPB 1000 milliGRAM(s) IV Intermittent <User Schedule>  levothyroxine Injectable 25 MICROGram(s) IV Push at bedtime  potassium chloride   Solution 40 milliEquivalent(s) Oral once    MEDICATIONS  (PRN):  acetaminophen    Suspension .. 975 milliGRAM(s) Oral every 6 hours PRN Temp greater or equal to 38.5C (101.3F), Mild Pain (1 - 3)  dextrose 40% Gel 15 Gram(s) Oral once PRN Blood Glucose LESS THAN 70 milliGRAM(s)/deciliter  glucagon  Injectable 1 milliGRAM(s) IntraMuscular once PRN Glucose LESS THAN 70 milligrams/deciliter        PHYSICAL EXAMINATION:  General Exam:   General appearance: No acute distress   Neck: supple, non-tender       Neurological Exam: off sedation  Mental Status:  open eyes to name; follows most commands (showed 2 fingers, squeezes hand)  Cranial Nerves:   PERRL, no nystagmus.  No facial asymmetry.     Motor:   Tone: Normal.                   Strength:   moving all ext spontaneously as well as on command   Tremor: No resting, postural or action tremor.  No myoclonus.    Sensation: +withdraws all extremities to painful stimuli      Gait: Deferred    LABS:                        10.8   9.7   )-----------( 136      ( 20 Apr 2019 21:38 )             33.8     21 Apr 2019 04:34    154    |  119    |  5      ----------------------------<  164    4.2     |  23     |  0.76     Ca    8.9        21 Apr 2019 04:34  Phos  1.4       20 Apr 2019 21:38  Mg     1.7       20 Apr 2019 21:38        CSF:    RBC Count - Spinal Fluid: 1 /uL (04-19-19 @ 15:13)  Total Nucleated Cell Count, CSF: 2 /uL (04-19-19 @ 15:13)          Protein, CSF: 41 mg/dL (04-19-19 @ 15:13)      RADIOLOGY & ADDITIONAL STUDIES:    Herpes Simplex Virus 1/2 PCR, CSF (04.19.19 @ 22:40)    Source HSV 1/2: CSF    HSV 1/2 PCR: NotDetec: HSV1/2 PCR assay is a real-time PCR test that detects and differentiates  HSV-1 and/or HSV-2 DNA in CSF (Vitreous Fluid). The results of this test  should be interpreted with consideration of all clinical and laboratory  findings.    _____________________________________________________________  EEG IMPRESSION/CLINICAL CORRELATE    Abnormal EEG study.  1.  6-10 subclinical seizures per hour recorded from the left posterior temporal region (rhythmic beta activity developing and evolving in frequency over the left posterior temporal region, duration 10 seconds, with no associated clinical signs) improved after 22:00  2. Structural lesion in the left hemisphere  3. Moderate diffuse encephalopathy      < from: MR Head No Cont (04.19.19 @ 05:44) >    IMPRESSION:    Left posterior lateral temporal and parietal occipital hyperintense T2   and FLAIR signal with faint hyperintense DWI signal and ADC T2 shine   through, with  slight  restricted diffusion, series 300 image 18. There   is a 4 x 6 mm  enhancement  in the left posterior lateral lateral   temporal lobe with questionable leptomeningeal enhancement and slight   susceptibility. Differential  considerations include atypical cavernoma   or  posterior reversible encephalopathy, mass such as low-grade neoplasm,   infectious, and or inflammatory change are not excluded.    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  - Frequent left temporo-occipital seizures, up to 10/hour, some improvements overnight with seizures near 2/hr near end of study  - LPDs / SIRPIDs temporal occipital  - Continuous Slowing, Lateralized, Left hemisphere (delta)  - Diffuse theta and polymorphic delta slowing, moderate  _____________________________________________________________  EEG IMPRESSION/CLINICAL CORRELATE    Abnormal EEG study.  Frequent left temporal occipital seizures and regional cortical irritability.  Moderate multifocal irregular slowing, worse in the left hemisphere ambulate

## 2019-04-25 ENCOUNTER — RX RENEWAL (OUTPATIENT)
Age: 64
End: 2019-04-25

## 2019-05-13 ENCOUNTER — RX RENEWAL (OUTPATIENT)
Age: 64
End: 2019-05-13

## 2019-08-11 ENCOUNTER — RX RENEWAL (OUTPATIENT)
Age: 64
End: 2019-08-11

## 2019-08-19 ENCOUNTER — MEDICATION RENEWAL (OUTPATIENT)
Age: 64
End: 2019-08-19

## 2019-08-19 ENCOUNTER — APPOINTMENT (OUTPATIENT)
Dept: INTERNAL MEDICINE | Facility: CLINIC | Age: 64
End: 2019-08-19
Payer: MEDICAID

## 2019-08-19 VITALS
DIASTOLIC BLOOD PRESSURE: 78 MMHG | BODY MASS INDEX: 28.32 KG/M2 | HEIGHT: 61 IN | SYSTOLIC BLOOD PRESSURE: 118 MMHG | OXYGEN SATURATION: 98 % | WEIGHT: 150 LBS | TEMPERATURE: 99 F | HEART RATE: 84 BPM

## 2019-08-19 PROCEDURE — 99214 OFFICE O/P EST MOD 30 MIN: CPT

## 2019-08-19 RX ORDER — OSELTAMIVIR PHOSPHATE 75 MG/1
75 CAPSULE ORAL TWICE DAILY
Qty: 10 | Refills: 0 | Status: DISCONTINUED | COMMUNITY
Start: 2019-01-11 | End: 2019-08-19

## 2019-08-20 ENCOUNTER — FORM ENCOUNTER (OUTPATIENT)
Age: 64
End: 2019-08-20

## 2019-08-20 NOTE — ASSESSMENT
[FreeTextEntry1] : 63 y/o female with 1 week of epigastric, RUQ abd pain, worse at night.  Continues PPI, no other changes (recent diet changes, gastric illness etc) identified. ddx includes esophagitis, gastritis, PUD, GB disease.  no fevers or changes in bowel movement/nausea.  Hpylro recurrence is possible.  Will check basic labs as noted and check ABd sono to further evaluate.  Patient advised to keep detailed diary of her symptoms as they relate to food intake, time of day, etc.  \par \par Regarding transient hand swelling with prolonged walking in heat - suspect venous congestion as it quickly resolves - renal and liver studies ordered to r/o other causes.

## 2019-08-20 NOTE — HISTORY OF PRESENT ILLNESS
[FreeTextEntry8] : c/o abd pain x 2 weeks, feels like her H Pylori pain\par \par stomach pain has recurred, epigastric to RUQ, described as stabbing, 8/10. mostly at night, does not seem to be correlated with eating.  no nausea, no diarrhea, no blood in stool.  Weight is up, tried family member's abx? for 2 days which may have helped.  continues her acid suppression, she states, pantoprazole nightly..  no dysphagia.  has not tried antacids.  \par \par Notes that with prolonged walking in the summer, her hands are swelling (not feet).  resolves when she gets home and rests.  does not have pain in joints otherwiseWalks a lot, does not reports chest pain, nor ARROYO.

## 2019-08-20 NOTE — PHYSICAL EXAM
[No Acute Distress] : no acute distress [Well-Appearing] : well-appearing [No Lymphadenopathy] : no lymphadenopathy [Supple] : supple [No Respiratory Distress] : no respiratory distress  [Clear to Auscultation] : lungs were clear to auscultation bilaterally [Normal Rate] : normal rate  [Regular Rhythm] : with a regular rhythm [Normal S1, S2] : normal S1 and S2 [No Murmur] : no murmur heard [No Edema] : there was no peripheral edema [Soft] : abdomen soft [Non-distended] : non-distended [No Masses] : no abdominal mass palpated [de-identified] : pats [de-identified] : johana

## 2019-08-21 ENCOUNTER — OUTPATIENT (OUTPATIENT)
Dept: OUTPATIENT SERVICES | Facility: HOSPITAL | Age: 64
LOS: 1 days | End: 2019-08-21
Payer: MEDICAID

## 2019-08-21 ENCOUNTER — APPOINTMENT (OUTPATIENT)
Dept: ULTRASOUND IMAGING | Facility: CLINIC | Age: 64
End: 2019-08-21
Payer: MEDICAID

## 2019-08-21 DIAGNOSIS — Z98.890 OTHER SPECIFIED POSTPROCEDURAL STATES: Chronic | ICD-10-CM

## 2019-08-21 DIAGNOSIS — M65.332 TRIGGER FINGER, LEFT MIDDLE FINGER: Chronic | ICD-10-CM

## 2019-08-21 DIAGNOSIS — Z00.8 ENCOUNTER FOR OTHER GENERAL EXAMINATION: ICD-10-CM

## 2019-08-21 DIAGNOSIS — Z98.51 TUBAL LIGATION STATUS: Chronic | ICD-10-CM

## 2019-08-21 LAB
ALBUMIN SERPL ELPH-MCNC: 4.8 G/DL
ALP BLD-CCNC: 100 U/L
ALT SERPL-CCNC: 16 U/L
ANION GAP SERPL CALC-SCNC: 13 MMOL/L
AST SERPL-CCNC: 18 U/L
BASOPHILS # BLD AUTO: 0.04 K/UL
BASOPHILS NFR BLD AUTO: 0.7 %
BILIRUB SERPL-MCNC: 0.5 MG/DL
BUN SERPL-MCNC: 11 MG/DL
CALCIUM SERPL-MCNC: 9.7 MG/DL
CHLORIDE SERPL-SCNC: 104 MMOL/L
CHOLEST SERPL-MCNC: 163 MG/DL
CHOLEST/HDLC SERPL: 3.4 RATIO
CO2 SERPL-SCNC: 28 MMOL/L
CREAT SERPL-MCNC: 0.78 MG/DL
EOSINOPHIL # BLD AUTO: 0.31 K/UL
EOSINOPHIL NFR BLD AUTO: 5.8 %
GLUCOSE SERPL-MCNC: 85 MG/DL
HCT VFR BLD CALC: 41.1 %
HDLC SERPL-MCNC: 48 MG/DL
HGB BLD-MCNC: 12.9 G/DL
IMM GRANULOCYTES NFR BLD AUTO: 0.2 %
LDLC SERPL CALC-MCNC: 78 MG/DL
LYMPHOCYTES # BLD AUTO: 2.07 K/UL
LYMPHOCYTES NFR BLD AUTO: 38.6 %
MAN DIFF?: NORMAL
MCHC RBC-ENTMCNC: 29.9 PG
MCHC RBC-ENTMCNC: 31.4 GM/DL
MCV RBC AUTO: 95.4 FL
MONOCYTES # BLD AUTO: 0.49 K/UL
MONOCYTES NFR BLD AUTO: 9.1 %
NEUTROPHILS # BLD AUTO: 2.44 K/UL
NEUTROPHILS NFR BLD AUTO: 45.6 %
PLATELET # BLD AUTO: 204 K/UL
POTASSIUM SERPL-SCNC: 4.4 MMOL/L
PROT SERPL-MCNC: 7.1 G/DL
RBC # BLD: 4.31 M/UL
RBC # FLD: 13.2 %
SODIUM SERPL-SCNC: 145 MMOL/L
TRIGL SERPL-MCNC: 187 MG/DL
WBC # FLD AUTO: 5.36 K/UL

## 2019-08-21 PROCEDURE — 76700 US EXAM ABDOM COMPLETE: CPT

## 2019-08-21 PROCEDURE — 76700 US EXAM ABDOM COMPLETE: CPT | Mod: 26

## 2019-08-26 LAB — H PYLORI AG STL QL: NOT DETECTED

## 2019-11-14 ENCOUNTER — FORM ENCOUNTER (OUTPATIENT)
Age: 64
End: 2019-11-14

## 2019-11-14 ENCOUNTER — APPOINTMENT (OUTPATIENT)
Dept: INTERNAL MEDICINE | Facility: CLINIC | Age: 64
End: 2019-11-14
Payer: MEDICAID

## 2019-11-14 VITALS
RESPIRATION RATE: 18 BRPM | HEART RATE: 88 BPM | HEIGHT: 61 IN | SYSTOLIC BLOOD PRESSURE: 123 MMHG | DIASTOLIC BLOOD PRESSURE: 80 MMHG

## 2019-11-14 PROCEDURE — 99214 OFFICE O/P EST MOD 30 MIN: CPT | Mod: 25

## 2019-11-14 PROCEDURE — 36415 COLL VENOUS BLD VENIPUNCTURE: CPT

## 2019-11-14 NOTE — ASSESSMENT
[FreeTextEntry1] : 1) L arm pain \par - appears muscular\par - start mobic 7.5 QD\par - inc PPI to BID \par \par 2) Back pain \par - start PT \par \par 3) Abd pain \par - will check amylase / lipase \par - CT abd with contrast

## 2019-11-14 NOTE — HISTORY OF PRESENT ILLNESS
[FreeTextEntry1] : here for the following issues \par \par 1) ongoing epigastric pain not related to meal\par - US - neg\par - H Pylori - neg\par - repeat EGD- gastritis / esophagitis \par - poor response to PPI \par \par HL \par taking meds w/out AE \par LDL - 78 \par \par Pain in the L arm  , biceps area , achy 7/10 \par no injury \par has had it x 1 week \par \par also has had ch low back pain \par has had back sx - last year \par still has some pain radiating to the L leg occasionally \par no weakness

## 2019-11-15 ENCOUNTER — APPOINTMENT (OUTPATIENT)
Dept: CT IMAGING | Facility: CLINIC | Age: 64
End: 2019-11-15
Payer: MEDICAID

## 2019-11-15 ENCOUNTER — OUTPATIENT (OUTPATIENT)
Dept: OUTPATIENT SERVICES | Facility: HOSPITAL | Age: 64
LOS: 1 days | End: 2019-11-15
Payer: MEDICAID

## 2019-11-15 DIAGNOSIS — Z98.890 OTHER SPECIFIED POSTPROCEDURAL STATES: Chronic | ICD-10-CM

## 2019-11-15 DIAGNOSIS — Z98.51 TUBAL LIGATION STATUS: Chronic | ICD-10-CM

## 2019-11-15 DIAGNOSIS — M65.332 TRIGGER FINGER, LEFT MIDDLE FINGER: Chronic | ICD-10-CM

## 2019-11-15 DIAGNOSIS — R10.9 UNSPECIFIED ABDOMINAL PAIN: ICD-10-CM

## 2019-11-15 LAB
AMYLASE/CREAT SERPL: 103 U/L
LPL SERPL-CCNC: 57 U/L

## 2019-11-15 PROCEDURE — 74160 CT ABDOMEN W/CONTRAST: CPT | Mod: 26

## 2019-11-15 PROCEDURE — 74160 CT ABDOMEN W/CONTRAST: CPT

## 2019-11-15 PROCEDURE — 82565 ASSAY OF CREATININE: CPT

## 2019-12-26 ENCOUNTER — FORM ENCOUNTER (OUTPATIENT)
Age: 64
End: 2019-12-26

## 2019-12-27 ENCOUNTER — OUTPATIENT (OUTPATIENT)
Dept: OUTPATIENT SERVICES | Facility: HOSPITAL | Age: 64
LOS: 1 days | End: 2019-12-27
Payer: MEDICAID

## 2019-12-27 ENCOUNTER — APPOINTMENT (OUTPATIENT)
Dept: ULTRASOUND IMAGING | Facility: CLINIC | Age: 64
End: 2019-12-27
Payer: MEDICAID

## 2019-12-27 ENCOUNTER — APPOINTMENT (OUTPATIENT)
Dept: MAMMOGRAPHY | Facility: CLINIC | Age: 64
End: 2019-12-27
Payer: MEDICAID

## 2019-12-27 DIAGNOSIS — Z98.51 TUBAL LIGATION STATUS: Chronic | ICD-10-CM

## 2019-12-27 DIAGNOSIS — Z12.31 ENCOUNTER FOR SCREENING MAMMOGRAM FOR MALIGNANT NEOPLASM OF BREAST: ICD-10-CM

## 2019-12-27 DIAGNOSIS — Z98.890 OTHER SPECIFIED POSTPROCEDURAL STATES: Chronic | ICD-10-CM

## 2019-12-27 DIAGNOSIS — M65.332 TRIGGER FINGER, LEFT MIDDLE FINGER: Chronic | ICD-10-CM

## 2019-12-27 PROCEDURE — 77067 SCR MAMMO BI INCL CAD: CPT | Mod: 26

## 2019-12-27 PROCEDURE — 77063 BREAST TOMOSYNTHESIS BI: CPT

## 2019-12-27 PROCEDURE — 77063 BREAST TOMOSYNTHESIS BI: CPT | Mod: 26

## 2019-12-27 PROCEDURE — 77067 SCR MAMMO BI INCL CAD: CPT

## 2019-12-30 ENCOUNTER — RX RENEWAL (OUTPATIENT)
Age: 64
End: 2019-12-30

## 2020-01-15 ENCOUNTER — APPOINTMENT (OUTPATIENT)
Dept: INTERNAL MEDICINE | Facility: CLINIC | Age: 65
End: 2020-01-15
Payer: MEDICAID

## 2020-01-15 VITALS
DIASTOLIC BLOOD PRESSURE: 78 MMHG | BODY MASS INDEX: 28.32 KG/M2 | OXYGEN SATURATION: 98 % | HEIGHT: 61 IN | WEIGHT: 150 LBS | TEMPERATURE: 98.7 F | HEART RATE: 80 BPM | SYSTOLIC BLOOD PRESSURE: 126 MMHG

## 2020-01-15 DIAGNOSIS — Z82.61 FAMILY HISTORY OF ARTHRITIS: ICD-10-CM

## 2020-01-15 PROCEDURE — 36415 COLL VENOUS BLD VENIPUNCTURE: CPT

## 2020-01-15 PROCEDURE — 99396 PREV VISIT EST AGE 40-64: CPT | Mod: 25

## 2020-01-15 RX ORDER — DOCUSATE SODIUM 100 MG/1
100 CAPSULE, LIQUID FILLED ORAL
Qty: 60 | Refills: 3 | Status: DISCONTINUED | COMMUNITY
Start: 2018-12-17 | End: 2020-01-15

## 2020-01-15 RX ORDER — MELOXICAM 7.5 MG/1
7.5 TABLET ORAL
Qty: 15 | Refills: 0 | Status: DISCONTINUED | COMMUNITY
Start: 2019-11-14 | End: 2020-01-15

## 2020-01-15 RX ORDER — ESTRADIOL 0.1 MG/G
0.1 CREAM VAGINAL
Qty: 43 | Refills: 0 | Status: DISCONTINUED | COMMUNITY
Start: 2017-05-16 | End: 2020-01-15

## 2020-01-15 NOTE — HISTORY OF PRESENT ILLNESS
[FreeTextEntry1] : 65 y/o female for CPE [de-identified] : Had CLS 2/15/2019: repeat 3-5 years (fragments of tub adenoma)\par EGD with mildly severe reflux esophagitis\par Had flu shot. \par \par Seen for abd discomfort in Nov 2019, amylase/lipase were normal, had CT of abdomen unremarkable.  reports pain is more in her upper chest wall/ribs, worse with stretching, lifting, turning side to side. resolves with position changes quickly.  Does feel more bloated.  Describes it as pressure in the rib cage.  Was not related to lifting, was no trauma, no heartburn, no n/v, no fevers, no cahgne in bowels. b/l shoulder pain with any movement x 1-2 months.  no joint swelling, some pain in her hips on both sides with walking.  Family hx of RA in grandmother.    \par \par Walks two hours a day.  needs to use a pillow between her legs.  Reports charley horses in both feet, needs to rub it out, accompanied by tingling in feet, exercise does not provoke or help.  \par \par takes aleve up to bid for joint aches, denies heartburn\par \par HCM:\par Breast Cancer 27 Dec 2019 Mammography Screening: Birads 1   \par Colon Cancer 15 Feb 2019 *s Colonoscopy   \par HCV Testing 09 Oct 2018 HCV Interpretation: Nonreactive \par \par \par

## 2020-01-15 NOTE — ASSESSMENT
[FreeTextEntry1] : 63 y/o female for CPE\par \par HCM: up to date on mammo, CRC screening, immunizations, GYN\par \par B/l shoulder pains, costochondral discomfort:  ? autoimmune process (PMR? RA?) possible family hx of RA in GM.  Someimprovement with bid Aleve.  labs as noted to eval for inflamm arthritis\par \par Check lipids\par \par Up to date with GI, resolution of h pylori, continues on ppi

## 2020-01-15 NOTE — PHYSICAL EXAM
[No Acute Distress] : no acute distress [Normal Sclera/Conjunctiva] : normal sclera/conjunctiva [No Lymphadenopathy] : no lymphadenopathy [Normal Outer Ear/Nose] : the outer ears and nose were normal in appearance [No Respiratory Distress] : no respiratory distress  [Clear to Auscultation] : lungs were clear to auscultation bilaterally [Regular Rhythm] : with a regular rhythm [Normal Rate] : normal rate  [Soft] : abdomen soft [No Edema] : there was no peripheral edema [Non Tender] : non-tender [No Masses] : no abdominal mass palpated [Non-distended] : non-distended [Normal Supraclavicular Nodes] : no supraclavicular lymphadenopathy [Normal Anterior Cervical Nodes] : no anterior cervical lymphadenopathy [No Focal Deficits] : no focal deficits [Normal Affect] : the affect was normal [Normal Gait] : normal gait [Normal Mood] : the mood was normal [Normal Insight/Judgement] : insight and judgment were intact [de-identified] : TTP over constochondral joints b/l [de-identified] : pain with abduction at both shoulders

## 2020-01-15 NOTE — REVIEW OF SYSTEMS
[Chest Pain] : chest pain [Joint Pain] : joint pain [Negative] : Genitourinary [FreeTextEntry5] : see hpi [FreeTextEntry9] : see hpi

## 2020-01-17 LAB
ALBUMIN SERPL ELPH-MCNC: 5 G/DL
ALP BLD-CCNC: 99 U/L
ALT SERPL-CCNC: 14 U/L
ANION GAP SERPL CALC-SCNC: 14 MMOL/L
AST SERPL-CCNC: 18 U/L
BASOPHILS # BLD AUTO: 0.04 K/UL
BASOPHILS NFR BLD AUTO: 0.7 %
BILIRUB SERPL-MCNC: 0.4 MG/DL
BUN SERPL-MCNC: 15 MG/DL
CALCIUM SERPL-MCNC: 9.9 MG/DL
CCP AB SER IA-ACNC: <8 UNITS
CHLORIDE SERPL-SCNC: 104 MMOL/L
CHOLEST SERPL-MCNC: 168 MG/DL
CHOLEST/HDLC SERPL: 2.7 RATIO
CO2 SERPL-SCNC: 26 MMOL/L
CREAT SERPL-MCNC: 0.77 MG/DL
CRP SERPL-MCNC: <0.1 MG/DL
EOSINOPHIL # BLD AUTO: 0.46 K/UL
EOSINOPHIL NFR BLD AUTO: 7.7 %
ERYTHROCYTE [SEDIMENTATION RATE] IN BLOOD BY WESTERGREN METHOD: 11 MM/HR
ESTIMATED AVERAGE GLUCOSE: 123 MG/DL
GLUCOSE SERPL-MCNC: 95 MG/DL
HBA1C MFR BLD HPLC: 5.9 %
HCT VFR BLD CALC: 40.8 %
HDLC SERPL-MCNC: 61 MG/DL
HGB BLD-MCNC: 13.1 G/DL
IMM GRANULOCYTES NFR BLD AUTO: 0.3 %
LDLC SERPL CALC-MCNC: 85 MG/DL
LYMPHOCYTES # BLD AUTO: 2.09 K/UL
LYMPHOCYTES NFR BLD AUTO: 34.9 %
MAN DIFF?: NORMAL
MCHC RBC-ENTMCNC: 29.8 PG
MCHC RBC-ENTMCNC: 32.1 GM/DL
MCV RBC AUTO: 92.7 FL
MONOCYTES # BLD AUTO: 0.47 K/UL
MONOCYTES NFR BLD AUTO: 7.9 %
NEUTROPHILS # BLD AUTO: 2.9 K/UL
NEUTROPHILS NFR BLD AUTO: 48.5 %
PLATELET # BLD AUTO: 240 K/UL
POTASSIUM SERPL-SCNC: 4.4 MMOL/L
PROT SERPL-MCNC: 7.4 G/DL
RBC # BLD: 4.4 M/UL
RBC # FLD: 12.6 %
RF+CCP IGG SER-IMP: NEGATIVE
RHEUMATOID FACT SER QL: <10 IU/ML
SODIUM SERPL-SCNC: 144 MMOL/L
TRIGL SERPL-MCNC: 112 MG/DL
WBC # FLD AUTO: 5.98 K/UL

## 2020-01-21 LAB — ANA SER IF-ACNC: NEGATIVE

## 2020-03-11 ENCOUNTER — APPOINTMENT (OUTPATIENT)
Dept: GASTROENTEROLOGY | Facility: CLINIC | Age: 65
End: 2020-03-11
Payer: MEDICARE

## 2020-03-11 VITALS
WEIGHT: 156 LBS | DIASTOLIC BLOOD PRESSURE: 62 MMHG | BODY MASS INDEX: 29.45 KG/M2 | SYSTOLIC BLOOD PRESSURE: 110 MMHG | HEART RATE: 90 BPM | RESPIRATION RATE: 17 BRPM | OXYGEN SATURATION: 97 % | HEIGHT: 61 IN

## 2020-03-11 PROCEDURE — 99213 OFFICE O/P EST LOW 20 MIN: CPT

## 2020-03-11 NOTE — HISTORY OF PRESENT ILLNESS
[FreeTextEntry1] : Chata Presents for a followup visit. She reports that she no longer has heartburn symptoms but has intermittent epigastric abdominal pain, at times burning in sensation, at times sharp in nature. No associated nausea or vomiting. She denies dysphagia odynophagia. She denies Changes in bowel habits. She denies rectal bleeding or melena. She underwent an upper endoscopy and a colonoscopy February 2019 showing mild reflux esophagitis and gastritis negative for H. pylori. Colonoscopy with a small tubular adenoma in the cecum. No family history of colon cancer.Currently she is taking pantoprazole at bedtime. Gastric biopsies negative for H. pylori. She also had H. pylori stool antigen August 2019 which was negative.

## 2020-03-11 NOTE — ASSESSMENT
[FreeTextEntry1] : This is a 65-year-old female with history of chronic GERD and H. pylori gastritis treated with amoxicillin, clarithromycin and PPI. She no longer has GERD but reports of dyspepsia. I recommend taking pantoprazole 40 mg half hour before breakfast and adding famotidine 40 mg at bedtime. She is to call me if no response after 2 weeks at which time I will repeat an upper endoscopy.

## 2020-10-02 ENCOUNTER — APPOINTMENT (OUTPATIENT)
Dept: ORTHOPEDIC SURGERY | Facility: CLINIC | Age: 65
End: 2020-10-02
Payer: MEDICARE

## 2020-10-02 VITALS
WEIGHT: 149 LBS | BODY MASS INDEX: 28.13 KG/M2 | SYSTOLIC BLOOD PRESSURE: 143 MMHG | HEIGHT: 61 IN | DIASTOLIC BLOOD PRESSURE: 85 MMHG | TEMPERATURE: 97.3 F | HEART RATE: 75 BPM

## 2020-10-02 PROCEDURE — 72100 X-RAY EXAM L-S SPINE 2/3 VWS: CPT

## 2020-10-02 PROCEDURE — 73630 X-RAY EXAM OF FOOT: CPT | Mod: LT

## 2020-10-02 PROCEDURE — 99214 OFFICE O/P EST MOD 30 MIN: CPT

## 2020-10-02 NOTE — DISCUSSION/SUMMARY
[Medication Risks Reviewed] : Medication risks reviewed [de-identified] : She will continue with her topical patches as they seem to help her.  She will use moist heat and has been started on diclofenac 75 mg twice a day as a nonsteroidal anti-inflammatory.  She will call if there are problems with the medication or worsening of her symptoms and I will see her for follow-up in 3-1/2 weeks.

## 2020-10-02 NOTE — HISTORY OF PRESENT ILLNESS
[de-identified] : She had surgery for herniated lumbar disc at the L4-5 level above the transitional segment in 2018 and did very well afterwards.  Over the last 3 months she has had some burning pain at the lumbosacral junction and some left buttock and leg pain with some cramps in her feet.  The pain is worse sitting and she feels more comfortable standing and walking.  She has been using some topical patches and Aleve.  She has had some tingling in both feet. [Pain Location] : pain [Worsening] : worsening [6] : a maximum pain level of 6/10

## 2020-10-02 NOTE — PHYSICAL EXAM
[de-identified] : She is comfortable as I take the history.  There is no paravertebral muscle spasm or sciatic notch tenderness.  There is a trace of trochanteric tenderness on the left but none on the right.  Forward flexion of the spine is painless.  Her midline incision is well-healed without evidence of any infection.  Lower extremity reflexes are 1+ and symmetrical with normal motor power in all lower extremity groups and normal sensation in all dermatomes.  There is some tenderness of the second and third metatarsals of her left foot. [de-identified] : 3 views of the left foot show no evidence of any bony abnormality.  AP and lateral x-rays of the lumbar spine reveal for the disc space narrowing at the L4-5 level above her transitional segment.

## 2020-10-22 ENCOUNTER — RX RENEWAL (OUTPATIENT)
Age: 65
End: 2020-10-22

## 2020-10-26 ENCOUNTER — APPOINTMENT (OUTPATIENT)
Dept: ORTHOPEDIC SURGERY | Facility: CLINIC | Age: 65
End: 2020-10-26
Payer: MEDICARE

## 2020-10-26 VITALS
WEIGHT: 149 LBS | BODY MASS INDEX: 28.13 KG/M2 | HEIGHT: 61 IN | DIASTOLIC BLOOD PRESSURE: 83 MMHG | SYSTOLIC BLOOD PRESSURE: 133 MMHG | HEART RATE: 70 BPM

## 2020-10-26 PROCEDURE — 99214 OFFICE O/P EST MOD 30 MIN: CPT

## 2020-10-26 PROCEDURE — 99072 ADDL SUPL MATRL&STAF TM PHE: CPT

## 2020-10-26 NOTE — DISCUSSION/SUMMARY
[Medication Risks Reviewed] : Medication risks reviewed [de-identified] : She has been placed on a 10-day prednisone taper after which she will resume the diclofenac 75 mg twice a day and I will see her for follow-up in 4 weeks.  She will call if there are problems with the medication or worsening of her symptoms.

## 2020-10-26 NOTE — HISTORY OF PRESENT ILLNESS
[de-identified] : She returns for follow-up of her burning lower back pain and her left leg symptoms.  She has not had problems tolerating the diclofenac.  The burning lower back pain that was graded as an 8 at the time of her last visit is currently only a 1.  She had only left buttock pain at the time of her last visit that she graded as a 4 or 5.  It is unchanged but now extends to the groin and anterior thigh. [Pain Location] : pain [Improving] : improving

## 2020-10-26 NOTE — PHYSICAL EXAM
[de-identified] : With ambulation she slightly favors the left leg.  She can tiptoe and heel walk without difficulty.  There is no paravertebral muscle spasm.  There is a trace of left-sided trochanteric and sciatic notch tenderness but none on the right.  Reflexes remain 1+ and symmetrical with motor power normal in all lower extremity groups and straight leg raising negative to 90 degrees.

## 2020-11-02 ENCOUNTER — APPOINTMENT (OUTPATIENT)
Dept: INTERNAL MEDICINE | Facility: CLINIC | Age: 65
End: 2020-11-02
Payer: MEDICARE

## 2020-11-02 VITALS
HEIGHT: 61 IN | DIASTOLIC BLOOD PRESSURE: 80 MMHG | WEIGHT: 148 LBS | SYSTOLIC BLOOD PRESSURE: 136 MMHG | OXYGEN SATURATION: 98 % | BODY MASS INDEX: 27.94 KG/M2 | TEMPERATURE: 35.6 F | HEART RATE: 82 BPM

## 2020-11-02 PROCEDURE — 99072 ADDL SUPL MATRL&STAF TM PHE: CPT

## 2020-11-02 PROCEDURE — 90732 PPSV23 VACC 2 YRS+ SUBQ/IM: CPT

## 2020-11-02 PROCEDURE — G0009: CPT

## 2020-11-02 PROCEDURE — 99213 OFFICE O/P EST LOW 20 MIN: CPT | Mod: 25

## 2020-11-02 RX ORDER — DICLOFENAC SODIUM 75 MG/1
75 TABLET, DELAYED RELEASE ORAL
Qty: 60 | Refills: 0 | Status: DISCONTINUED | COMMUNITY
Start: 2020-10-02 | End: 2020-11-02

## 2020-11-02 NOTE — ASSESSMENT
[FreeTextEntry1] : 64 y/o female with peristent left leg pain, radiating, despite recent NSAIDS and steroids, impacting sleep and function\par -continue ortho spine f/u, complete steroids\par -PMR eval for any additional therapies that may enahnce function\par -may benefit from repeat MRI if symptoms not muchimproved after finishing steroid course\par \par HCM:\par Mammo ordered\par had flu shot\par Administered Pneumovax in right deltoid today

## 2020-11-02 NOTE — HISTORY OF PRESENT ILLNESS
[FreeTextEntry1] : leg pain [de-identified] : \par \par \par FLu shot done already\par \par Had a recurrence of the burning pain in her left buttock at end of September, improved with diclofenac, however still has left leg pain.  radiates from back down to toes.  Saw Ortho spine on 10/26: started on 10 day prednisone taper in addition to diclofenac 75 mg bid\par Strength is okay, no changes in bowel or bladder. no numbness. Started pred 1 week ago, has not noticed much relief, needs to keep moving.  \par \par Hx of L5 nerve root issues in 2018, s/p surgery with Dr. Be that year, improved for a while, recurred this year.  Limits her sleep significantly, has difficulty with positioning to get comfortable even with the diclofenac and steroids.  \par ALso has had left sided arrm pain along with soreness on the left side of chest, which has improved.  \par \par Thinks she needs to see a neuroligist.  \par \par

## 2020-11-02 NOTE — PHYSICAL EXAM
[No Acute Distress] : no acute distress [Well-Appearing] : well-appearing [Normal Sclera/Conjunctiva] : normal sclera/conjunctiva [No Edema] : there was no peripheral edema [No CVA Tenderness] : no CVA  tenderness [No Spinal Tenderness] : no spinal tenderness [2+] : left 2+ [1+] : left 1+ [de-identified] : strength 5/5 at hip, knee, ankle, b/l

## 2020-11-13 ENCOUNTER — APPOINTMENT (OUTPATIENT)
Dept: PHYSICAL MEDICINE AND REHAB | Facility: CLINIC | Age: 65
End: 2020-11-13
Payer: MEDICARE

## 2020-11-13 VITALS
SYSTOLIC BLOOD PRESSURE: 148 MMHG | TEMPERATURE: 97.1 F | DIASTOLIC BLOOD PRESSURE: 87 MMHG | HEART RATE: 73 BPM | OXYGEN SATURATION: 99 %

## 2020-11-13 PROCEDURE — 99203 OFFICE O/P NEW LOW 30 MIN: CPT

## 2020-11-13 PROCEDURE — 99072 ADDL SUPL MATRL&STAF TM PHE: CPT

## 2020-11-13 NOTE — ASSESSMENT
[FreeTextEntry1] : 65 y.o. F S/P L4-L5 laminectomy and discectomy above partially sacralized L5 7/24/18, presenting to clinic for worsening left low back pain, sciatica symptoms.\par \par Recs:\par 1. Start PT for LE and core muscle strengthening and stretching, given prescription.\par 2. Start Gabapentin 300mg QHS x1 week then titrate up to 300mg TID if tolerated. Reviewed titration schedule. \par 3. Obtain MRI of lumbar spine.\par 4. Patient told to go to ED if she experiences bowel or bladder incontinence, weakness, etc.\par 5. Return to clinic in 6 weeks to f/u, or earlier PRN. Spoke to patient regarding possibly trying epidural injection if no improvement in pain.

## 2020-11-13 NOTE — HISTORY OF PRESENT ILLNESS
[FreeTextEntry1] : Patient is a 65 y.o. F S/P L4-L5 laminectomy and discectomy above partially sacralized L5 7/24/18, presenting to clinic for worsening left low back pain. Pain is 9/10 in intensity at its worst and characterized as sharp shooting pain down the left leg down to the foot. Patient also c/o occasional numbness and tingling in B/L dorsum of feet L>R. She was prescribed a 10 day course of Prednisone taper and Diclofenac after taper is complete. She endorses side effects from Prednisone including palpitations, feeling "high", and high BP. She completed Prednisone course 11/9/20 but still feels effects of steroids. Pain is similar to but less intense than pain prior to lumbar surgery. Also prior to lumbar surgery, patient had 4 epidural lumbar injections which provided temporary relief. She has never tried PT. Denies changes in bowel or bladder habits, weakness in legs, falls or near falls.

## 2020-11-13 NOTE — PHYSICAL EXAM
[FreeTextEntry1] : Constitutional: alert, in no acute distress\par Chest: equal lung excursion, no respiratory distress\par Abd: soft\par \par Back: well healed midline lumbar surgical incision, no erythema. TTP to ~L4 region midline and in bilateral paraspinal muscles. No SI joint tenderness, no tenderness over gluteal muscles. Tenderness with lumbar rotation to the right and facet loading. +SLR/Lasegue's sign on the LLE. \par \par Motor: 5/5 muscle strength in B/L UE and LE\par Sensory: intact B/L UE and LE\par \par Gait: antalgic gait

## 2020-11-16 ENCOUNTER — RX RENEWAL (OUTPATIENT)
Age: 65
End: 2020-11-16

## 2020-11-20 ENCOUNTER — APPOINTMENT (OUTPATIENT)
Dept: MRI IMAGING | Facility: CLINIC | Age: 65
End: 2020-11-20
Payer: MEDICARE

## 2020-11-20 ENCOUNTER — OUTPATIENT (OUTPATIENT)
Dept: OUTPATIENT SERVICES | Facility: HOSPITAL | Age: 65
LOS: 1 days | End: 2020-11-20
Payer: MEDICARE

## 2020-11-20 DIAGNOSIS — M54.16 RADICULOPATHY, LUMBAR REGION: ICD-10-CM

## 2020-11-20 DIAGNOSIS — Z98.890 OTHER SPECIFIED POSTPROCEDURAL STATES: Chronic | ICD-10-CM

## 2020-11-20 DIAGNOSIS — M65.332 TRIGGER FINGER, LEFT MIDDLE FINGER: Chronic | ICD-10-CM

## 2020-11-20 DIAGNOSIS — Z98.51 TUBAL LIGATION STATUS: Chronic | ICD-10-CM

## 2020-11-20 DIAGNOSIS — Z00.8 ENCOUNTER FOR OTHER GENERAL EXAMINATION: ICD-10-CM

## 2020-11-20 PROCEDURE — 72148 MRI LUMBAR SPINE W/O DYE: CPT | Mod: 26

## 2020-11-20 PROCEDURE — 72148 MRI LUMBAR SPINE W/O DYE: CPT

## 2020-11-23 ENCOUNTER — LABORATORY RESULT (OUTPATIENT)
Age: 65
End: 2020-11-23

## 2020-11-23 ENCOUNTER — APPOINTMENT (OUTPATIENT)
Dept: ORTHOPEDIC SURGERY | Facility: CLINIC | Age: 65
End: 2020-11-23
Payer: MEDICARE

## 2020-11-23 VITALS — TEMPERATURE: 97.2 F

## 2020-11-23 PROCEDURE — 99214 OFFICE O/P EST MOD 30 MIN: CPT

## 2020-11-23 NOTE — PHYSICAL EXAM
[de-identified] : On exam there is no paravertebral muscle spasm.  Reflexes remain 1+ and symmetrical and motor power is normal in all lower extremity groups.  Straight leg raising is negative to 90 degrees on the right but it 90 degrees on the left in the sitting position causes some buttock discomfort.  There are some mild bilateral sciatic notch sensitivity.

## 2020-11-23 NOTE — HISTORY OF PRESENT ILLNESS
[de-identified] : He had a lumbar laminectomy and discectomy in 2018 for herniated disc at L4-5 above a partially sacralized L5 vertebra.  She had failed extensive nonsurgical treatment.  She did well afterwards.  She presented in early October with lower back pain that she graded as an 8 with pain in the left buttock that she graded as a 4 or 5 and some cramping in her feet.  She was started on diclofenac with improvement of the back pain but not the leg pain.  She had a 10-day course of prednisone and the back pain is fully resolved but she still has some pain in the left buttock and some cramping in her feet.  She saw a physiatrist and had a repeat MRI of the lumbar spine there is a minor bulge at L4-5.  There is facet arthrosis at a few levels and perhaps some foraminal stenosis at L4-5 on the left. [Pain Location] : pain [Improving] : improving

## 2020-11-23 NOTE — DISCUSSION/SUMMARY
[Medication Risks Reviewed] : Medication risks reviewed [de-identified] : The back pain has resolved.  I have no explanation for the cramping in her feet.  There is some mild foraminal stenosis on the left at L4-5.  She will pursue epidural steroid injections again.  She will continue on the diclofenac 75 mg twice a day.  She has been given a prescription to obtain a liver function profile.  I will see her for follow-up 1 week after her first epidural.

## 2020-12-15 ENCOUNTER — RX RENEWAL (OUTPATIENT)
Age: 65
End: 2020-12-15

## 2020-12-17 ENCOUNTER — RX RENEWAL (OUTPATIENT)
Age: 65
End: 2020-12-17

## 2021-01-08 ENCOUNTER — APPOINTMENT (OUTPATIENT)
Dept: PHYSICAL MEDICINE AND REHAB | Facility: CLINIC | Age: 66
End: 2021-01-08
Payer: MEDICARE

## 2021-01-08 VITALS
DIASTOLIC BLOOD PRESSURE: 83 MMHG | HEART RATE: 70 BPM | SYSTOLIC BLOOD PRESSURE: 137 MMHG | OXYGEN SATURATION: 99 % | TEMPERATURE: 97.4 F

## 2021-01-08 PROCEDURE — 99213 OFFICE O/P EST LOW 20 MIN: CPT

## 2021-01-08 PROCEDURE — 99072 ADDL SUPL MATRL&STAF TM PHE: CPT

## 2021-01-08 NOTE — PHYSICAL EXAM
[Normal] : Oriented to person, place, and time, insight and judgement were intact and the affect was normal [de-identified] : no resp distress [de-identified] : well prefused [de-identified] : dec lumb flexion [de-identified] : Incision intact

## 2021-01-08 NOTE — HISTORY OF PRESENT ILLNESS
[FreeTextEntry1] : Patient is doing much better. \par Is tolerating gabapentin 300 TID\par Has been going for PT TIW and going for walks which helps.

## 2021-01-08 NOTE — ASSESSMENT
[FreeTextEntry1] : - Gabapentin 300 TID\par - Continue PT which transition to HEP\par - Follow up in 3-4 months for med renewal

## 2021-01-11 ENCOUNTER — OUTPATIENT (OUTPATIENT)
Dept: OUTPATIENT SERVICES | Facility: HOSPITAL | Age: 66
LOS: 1 days | End: 2021-01-11
Payer: MEDICARE

## 2021-01-11 ENCOUNTER — RESULT REVIEW (OUTPATIENT)
Age: 66
End: 2021-01-11

## 2021-01-11 ENCOUNTER — APPOINTMENT (OUTPATIENT)
Dept: MAMMOGRAPHY | Facility: CLINIC | Age: 66
End: 2021-01-11
Payer: MEDICARE

## 2021-01-11 DIAGNOSIS — M65.332 TRIGGER FINGER, LEFT MIDDLE FINGER: Chronic | ICD-10-CM

## 2021-01-11 DIAGNOSIS — Z98.51 TUBAL LIGATION STATUS: Chronic | ICD-10-CM

## 2021-01-11 DIAGNOSIS — Z00.8 ENCOUNTER FOR OTHER GENERAL EXAMINATION: ICD-10-CM

## 2021-01-11 DIAGNOSIS — Z00.00 ENCOUNTER FOR GENERAL ADULT MEDICAL EXAMINATION WITHOUT ABNORMAL FINDINGS: ICD-10-CM

## 2021-01-11 DIAGNOSIS — Z98.890 OTHER SPECIFIED POSTPROCEDURAL STATES: Chronic | ICD-10-CM

## 2021-01-11 PROCEDURE — 77063 BREAST TOMOSYNTHESIS BI: CPT

## 2021-01-11 PROCEDURE — 77063 BREAST TOMOSYNTHESIS BI: CPT | Mod: 26

## 2021-01-11 PROCEDURE — 77067 SCR MAMMO BI INCL CAD: CPT | Mod: 26

## 2021-01-11 PROCEDURE — 77067 SCR MAMMO BI INCL CAD: CPT

## 2021-01-25 ENCOUNTER — APPOINTMENT (OUTPATIENT)
Dept: INTERNAL MEDICINE | Facility: CLINIC | Age: 66
End: 2021-01-25
Payer: MEDICARE

## 2021-01-25 ENCOUNTER — TRANSCRIPTION ENCOUNTER (OUTPATIENT)
Age: 66
End: 2021-01-25

## 2021-01-25 VITALS
OXYGEN SATURATION: 98 % | SYSTOLIC BLOOD PRESSURE: 130 MMHG | BODY MASS INDEX: 28.32 KG/M2 | HEIGHT: 61 IN | WEIGHT: 150 LBS | DIASTOLIC BLOOD PRESSURE: 77 MMHG | HEART RATE: 72 BPM | TEMPERATURE: 98.1 F

## 2021-01-25 PROCEDURE — 99072 ADDL SUPL MATRL&STAF TM PHE: CPT

## 2021-01-25 PROCEDURE — G0402 INITIAL PREVENTIVE EXAM: CPT

## 2021-01-25 PROCEDURE — 36415 COLL VENOUS BLD VENIPUNCTURE: CPT

## 2021-01-25 RX ORDER — DICLOFENAC SODIUM 75 MG/1
75 TABLET, DELAYED RELEASE ORAL
Qty: 60 | Refills: 0 | Status: DISCONTINUED | COMMUNITY
Start: 2020-10-26 | End: 2021-01-25

## 2021-01-25 RX ORDER — PREDNISONE 20 MG/1
20 TABLET ORAL
Qty: 26 | Refills: 0 | Status: DISCONTINUED | COMMUNITY
Start: 2020-10-26 | End: 2021-01-25

## 2021-01-25 RX ORDER — GABAPENTIN 300 MG/1
300 CAPSULE ORAL 3 TIMES DAILY
Qty: 90 | Refills: 1 | Status: DISCONTINUED | COMMUNITY
Start: 2020-11-13 | End: 2021-01-25

## 2021-01-25 RX ORDER — DICLOFENAC SODIUM 75 MG/1
75 TABLET, DELAYED RELEASE ORAL
Qty: 60 | Refills: 0 | Status: DISCONTINUED | COMMUNITY
Start: 2020-11-23 | End: 2021-01-25

## 2021-01-25 NOTE — ASSESSMENT
[FreeTextEntry1] : 66 y/o female for AWV\par \par HCM:\par CRC screening due in  2 years\par Mammo up to date\par Prevnar in fall 2021\par COvid when available\par DEXA will order\par \par Lipids: check labs today\par \par GERD: on PPI/H2 blocker stable\par \par Back pain - better!! PT by gabapentin, following with Dr. Elizondo

## 2021-01-25 NOTE — PHYSICAL EXAM
[No Acute Distress] : no acute distress [Well-Appearing] : well-appearing [Normal Sclera/Conjunctiva] : normal sclera/conjunctiva [No Respiratory Distress] : no respiratory distress  [Clear to Auscultation] : lungs were clear to auscultation bilaterally [Normal Rate] : normal rate  [Regular Rhythm] : with a regular rhythm [Normal S1, S2] : normal S1 and S2 [No Murmur] : no murmur heard [No Edema] : there was no peripheral edema [Soft] : abdomen soft [Non Tender] : non-tender [Normal Supraclavicular Nodes] : no supraclavicular lymphadenopathy [Normal Anterior Cervical Nodes] : no anterior cervical lymphadenopathy [No Rash] : no rash [Normal Affect] : the affect was normal [Normal Mood] : the mood was normal

## 2021-01-25 NOTE — HISTORY OF PRESENT ILLNESS
[de-identified] : HCM:\par Breast Cancer 11 Jan 2021 Mammogram Digital Screening, Bilat: BI-RADS: 1   \par Colon Cancer 15 Feb 2019 *s Colonoscopy, repeat 3-5 years\par Osteoporosis will order\par HCV Testing 09 Oct 2018 HCV Interpretation: Nonreactive \par Pneumo (23) 02 Nov 2020 Pneumococcal polysaccharide vaccine, 23 valent   \par Td/Tdap 21 May 2015 Tdap   \par Zoster 31 Oct 2016 Zoster (Zostavax) \par \par FLu shot\par COVID vaccine waiting\par Prevnar: dealy till post-covid vaccine

## 2021-01-25 NOTE — HEALTH RISK ASSESSMENT
[No falls in past year] : Patient reported no falls in the past year [de-identified] : Ortho, PMR [Change in mental status noted] : No change in mental status noted [With Family] : lives with family [Fully functional (bathing, dressing, toileting, transferring, walking, feeding)] : Fully functional (bathing, dressing, toileting, transferring, walking, feeding) [Fully functional (using the telephone, shopping, preparing meals, housekeeping, doing laundry, using] : Fully functional and needs no help or supervision to perform IADLs (using the telephone, shopping, preparing meals, housekeeping, doing laundry, using transportation, managing medications and managing finances) [Reports changes in hearing] : Reports no changes in hearing [Reports changes in vision] : Reports no changes in vision [Reports normal functional visual acuity (ie: able to read med bottle)] : Reports normal functional visual acuity

## 2021-01-26 LAB
ALBUMIN SERPL ELPH-MCNC: 4.9 G/DL
ALP BLD-CCNC: 112 U/L
ALT SERPL-CCNC: 17 U/L
ANION GAP SERPL CALC-SCNC: 13 MMOL/L
AST SERPL-CCNC: 20 U/L
BASOPHILS # BLD AUTO: 0.03 K/UL
BASOPHILS NFR BLD AUTO: 0.6 %
BILIRUB SERPL-MCNC: 0.4 MG/DL
BUN SERPL-MCNC: 14 MG/DL
CALCIUM SERPL-MCNC: 9.7 MG/DL
CHLORIDE SERPL-SCNC: 105 MMOL/L
CHOLEST SERPL-MCNC: 166 MG/DL
CO2 SERPL-SCNC: 25 MMOL/L
CREAT SERPL-MCNC: 0.87 MG/DL
EOSINOPHIL # BLD AUTO: 0.33 K/UL
EOSINOPHIL NFR BLD AUTO: 7.1 %
ESTIMATED AVERAGE GLUCOSE: 126 MG/DL
GLUCOSE SERPL-MCNC: 93 MG/DL
HBA1C MFR BLD HPLC: 6 %
HCT VFR BLD CALC: 40.9 %
HDLC SERPL-MCNC: 56 MG/DL
HGB BLD-MCNC: 13.1 G/DL
IMM GRANULOCYTES NFR BLD AUTO: 0.2 %
LDLC SERPL CALC-MCNC: 87 MG/DL
LYMPHOCYTES # BLD AUTO: 1.68 K/UL
LYMPHOCYTES NFR BLD AUTO: 35.9 %
MAN DIFF?: NORMAL
MCHC RBC-ENTMCNC: 30.1 PG
MCHC RBC-ENTMCNC: 32 GM/DL
MCV RBC AUTO: 94 FL
MONOCYTES # BLD AUTO: 0.36 K/UL
MONOCYTES NFR BLD AUTO: 7.7 %
NEUTROPHILS # BLD AUTO: 2.27 K/UL
NEUTROPHILS NFR BLD AUTO: 48.5 %
NONHDLC SERPL-MCNC: 110 MG/DL
PLATELET # BLD AUTO: 224 K/UL
POTASSIUM SERPL-SCNC: 4.3 MMOL/L
PROT SERPL-MCNC: 7.5 G/DL
RBC # BLD: 4.35 M/UL
RBC # FLD: 12.6 %
SARS-COV-2 IGG SERPL IA-ACNC: 0.11 INDEX
SARS-COV-2 IGG SERPL QL IA: NEGATIVE
SODIUM SERPL-SCNC: 144 MMOL/L
TRIGL SERPL-MCNC: 118 MG/DL
TSH SERPL-ACNC: 2.1 UIU/ML
WBC # FLD AUTO: 4.68 K/UL

## 2021-01-27 ENCOUNTER — OUTPATIENT (OUTPATIENT)
Dept: OUTPATIENT SERVICES | Facility: HOSPITAL | Age: 66
LOS: 1 days | End: 2021-01-27
Payer: MEDICARE

## 2021-01-27 ENCOUNTER — TRANSCRIPTION ENCOUNTER (OUTPATIENT)
Age: 66
End: 2021-01-27

## 2021-01-27 ENCOUNTER — APPOINTMENT (OUTPATIENT)
Dept: RADIOLOGY | Facility: CLINIC | Age: 66
End: 2021-01-27
Payer: MEDICARE

## 2021-01-27 DIAGNOSIS — Z98.51 TUBAL LIGATION STATUS: Chronic | ICD-10-CM

## 2021-01-27 DIAGNOSIS — M65.332 TRIGGER FINGER, LEFT MIDDLE FINGER: Chronic | ICD-10-CM

## 2021-01-27 DIAGNOSIS — Z98.890 OTHER SPECIFIED POSTPROCEDURAL STATES: Chronic | ICD-10-CM

## 2021-01-27 DIAGNOSIS — Z00.8 ENCOUNTER FOR OTHER GENERAL EXAMINATION: ICD-10-CM

## 2021-01-27 PROCEDURE — 77080 DXA BONE DENSITY AXIAL: CPT

## 2021-01-27 PROCEDURE — 77080 DXA BONE DENSITY AXIAL: CPT | Mod: 26

## 2021-04-30 NOTE — H&P PST ADULT - MALLAMPATI CLASS
Subjective   Ciro Ibarra Class II - visualization of the soft palate, fauces, and uvula list.    Review of Systems   Constitutional: Negative for chills, diaphoresis, fatigue, fever, malaise/fatigue and weight loss.   HENT: Negative for congestion and sore throat.    Eyes: Negative for blurred vision.   Respiratory: Negative for cough and shortness of breath.    Cardiovascular: Negative for chest pain, palpitations, orthopnea and PND.   Gastrointestinal: Negative for abdominal pain, anorexia, change in bowel habit, nausea and vomiting.   Endocrine: Negative for polydipsia, polyphagia and polyuria.   Musculoskeletal: Negative for arthralgias, joint swelling, myalgias and neck pain.   Skin: Negative for rash.   Neurological: Negative for vertigo, weakness, numbness and headaches.       Objective   Physical Exam  Vitals reviewed.   Constitutional:       Appearance: Normal appearance.   HENT:      Head: Atraumatic.      Right Ear: Tympanic membrane, ear canal and external ear normal.      Left Ear: Tympanic membrane, ear canal and external ear normal.      Nose: Nose normal.      Mouth/Throat:      Mouth: Mucous membranes are moist.      Pharynx: Oropharynx is clear.   Cardiovascular:      Rate and Rhythm: Normal rate and regular rhythm.      Heart sounds: Normal heart sounds. No murmur heard.   No friction rub. No gallop.    Pulmonary:      Effort: Pulmonary effort is normal. No respiratory distress.      Breath sounds: Normal breath sounds. No stridor. No wheezing, rhonchi or rales.   Chest:      Chest wall: No tenderness.   Abdominal:      General: Abdomen is flat. Bowel sounds are normal. There is no distension.      Palpations: Abdomen is soft.      Tenderness: There is no abdominal tenderness. There is no guarding.   Musculoskeletal:      Cervical back: Normal range of motion.   Skin:     General: Skin is warm and dry.      Comments: No calluses,No lesions. Stereognosis is intact. Monofilament exam is normal.     Neurological:      Mental Status: He is alert.           Visit Vitals  /74   Pulse  "62   Ht 170.2 cm (67\")   Wt 73.3 kg (161 lb 9 oz)   SpO2 99%   BMI 25.30 kg/m²     Body mass index is 25.3 kg/m².      Assessment/Plan   Diagnoses and all orders for this visit:    1. Acquired hypothyroidism (Primary)  -     T4, free; Future  -     TSH; Future    2. Pure hypercholesterolemia  -     atorvastatin (LIPITOR) 40 MG tablet; Take 1 tablet by mouth Every Night.  Dispense: 90 tablet; Refill: 2  -     levothyroxine (SYNTHROID, LEVOTHROID) 100 MCG tablet; Take 1 tablet by mouth Daily.  Dispense: 90 tablet; Refill: 2  -     Lipid panel; Future    3. Diabetes mellitus without complication (CMS/HCC)  -     glucose blood (Accu-Chek Velvet Class III - visualization of the soft palate and the base of the uvula

## 2021-05-07 ENCOUNTER — APPOINTMENT (OUTPATIENT)
Dept: PHYSICAL MEDICINE AND REHAB | Facility: CLINIC | Age: 66
End: 2021-05-07

## 2021-06-03 ENCOUNTER — RX RENEWAL (OUTPATIENT)
Age: 66
End: 2021-06-03

## 2021-08-18 ENCOUNTER — NON-APPOINTMENT (OUTPATIENT)
Age: 66
End: 2021-08-18

## 2021-08-26 ENCOUNTER — APPOINTMENT (OUTPATIENT)
Dept: INTERNAL MEDICINE | Facility: CLINIC | Age: 66
End: 2021-08-26
Payer: MEDICARE

## 2021-08-26 ENCOUNTER — NON-APPOINTMENT (OUTPATIENT)
Age: 66
End: 2021-08-26

## 2021-08-26 PROCEDURE — 90732 PPSV23 VACC 2 YRS+ SUBQ/IM: CPT

## 2021-08-26 PROCEDURE — G0009: CPT

## 2021-08-28 ENCOUNTER — NON-APPOINTMENT (OUTPATIENT)
Age: 66
End: 2021-08-28

## 2021-09-24 ENCOUNTER — RX RENEWAL (OUTPATIENT)
Age: 66
End: 2021-09-24

## 2021-11-11 NOTE — PACU DISCHARGE NOTE - HYDRATION STATUS:
Satisfactory Calcipotriene Pregnancy And Lactation Text: This medication has not been proven safe during pregnancy. It is unknown if this medication is excreted in breast milk.

## 2021-12-31 ENCOUNTER — RX RENEWAL (OUTPATIENT)
Age: 66
End: 2021-12-31

## 2022-02-08 ENCOUNTER — NON-APPOINTMENT (OUTPATIENT)
Age: 67
End: 2022-02-08

## 2022-02-14 ENCOUNTER — APPOINTMENT (OUTPATIENT)
Dept: INTERNAL MEDICINE | Facility: CLINIC | Age: 67
End: 2022-02-14
Payer: MEDICARE

## 2022-02-14 VITALS
WEIGHT: 146 LBS | BODY MASS INDEX: 27.59 KG/M2 | OXYGEN SATURATION: 97 % | DIASTOLIC BLOOD PRESSURE: 86 MMHG | HEART RATE: 74 BPM | TEMPERATURE: 98.2 F | SYSTOLIC BLOOD PRESSURE: 132 MMHG

## 2022-02-14 DIAGNOSIS — M54.42 LUMBAGO WITH SCIATICA, LEFT SIDE: ICD-10-CM

## 2022-02-14 DIAGNOSIS — Z92.29 PERSONAL HISTORY OF OTHER DRUG THERAPY: ICD-10-CM

## 2022-02-14 DIAGNOSIS — Z09 ENCOUNTER FOR FOLLOW-UP EXAMINATION AFTER COMPLETED TREATMENT FOR CONDITIONS OTHER THAN MALIGNANT NEOPLASM: ICD-10-CM

## 2022-02-14 DIAGNOSIS — I83.811 VARICOSE VEINS OF RIGHT LOWER EXTREMITY WITH PAIN: ICD-10-CM

## 2022-02-14 DIAGNOSIS — Z87.19 PERSONAL HISTORY OF OTHER DISEASES OF THE DIGESTIVE SYSTEM: ICD-10-CM

## 2022-02-14 DIAGNOSIS — M25.511 PAIN IN RIGHT SHOULDER: ICD-10-CM

## 2022-02-14 DIAGNOSIS — Z20.828 CONTACT WITH AND (SUSPECTED) EXPOSURE TO OTHER VIRAL COMMUNICABLE DISEASES: ICD-10-CM

## 2022-02-14 DIAGNOSIS — R76.8 OTHER SPECIFIED ABNORMAL IMMUNOLOGICAL FINDINGS IN SERUM: ICD-10-CM

## 2022-02-14 DIAGNOSIS — K21.9 GASTRO-ESOPHAGEAL REFLUX DISEASE W/OUT ESOPHAGITIS: ICD-10-CM

## 2022-02-14 DIAGNOSIS — M94.0 CHONDROCOSTAL JUNCTION SYNDROME [TIETZE]: ICD-10-CM

## 2022-02-14 DIAGNOSIS — Z11.59 ENCOUNTER FOR SCREENING FOR OTHER VIRAL DISEASES: ICD-10-CM

## 2022-02-14 DIAGNOSIS — M65.332 TRIGGER FINGER, LEFT MIDDLE FINGER: ICD-10-CM

## 2022-02-14 DIAGNOSIS — Z87.898 PERSONAL HISTORY OF OTHER SPECIFIED CONDITIONS: ICD-10-CM

## 2022-02-14 DIAGNOSIS — Z23 ENCOUNTER FOR IMMUNIZATION: ICD-10-CM

## 2022-02-14 DIAGNOSIS — M51.26 OTHER INTERVERTEBRAL DISC DISPLACEMENT, LUMBAR REGION: ICD-10-CM

## 2022-02-14 DIAGNOSIS — Z12.11 ENCOUNTER FOR SCREENING FOR MALIGNANT NEOPLASM OF COLON: ICD-10-CM

## 2022-02-14 DIAGNOSIS — I83.899 VARICOSE VEINS OF UNSPECIFIED LOWER EXTREMITY WITH OTHER COMPLICATIONS: ICD-10-CM

## 2022-02-14 DIAGNOSIS — M25.512 PAIN IN RIGHT SHOULDER: ICD-10-CM

## 2022-02-14 PROCEDURE — 36415 COLL VENOUS BLD VENIPUNCTURE: CPT

## 2022-02-14 PROCEDURE — G0438: CPT

## 2022-02-14 RX ORDER — GABAPENTIN 300 MG/1
300 CAPSULE ORAL 3 TIMES DAILY
Qty: 90 | Refills: 1 | Status: DISCONTINUED | COMMUNITY
Start: 2021-01-08 | End: 2022-02-14

## 2022-02-14 RX ORDER — PANTOPRAZOLE 40 MG/1
40 TABLET, DELAYED RELEASE ORAL
Qty: 90 | Refills: 3 | Status: DISCONTINUED | COMMUNITY
Start: 2018-08-17 | End: 2022-02-14

## 2022-02-14 RX ORDER — PARSLEY 450 MG
50 CAPSULE ORAL
Refills: 0 | Status: ACTIVE | COMMUNITY
Start: 2022-02-14

## 2022-02-14 NOTE — HISTORY OF PRESENT ILLNESS
[de-identified] : \par \par Is taking daily baby ASA (for many years) does take H2 + ppi pantop 40\par Right middle finger - triggger finger, would like to return to hand surgery\par Notes tenderness to touch over chest wall in the past few weeks, not associated with SOB or other symptoms. only hurts when she pushes on her ribs\par DOes intermittently have left arm pain, not related to exertion\par Is concerned about her heart health given family hx  \par \par HCM:\par COVID vaccines completed x 3 2/24/21, 3/24/21, 11/17/21\par FLu shot Sep 2021\par Breast Cancer 11 Jan 2021 Mammogram Digital Screening, Bilat: BI-RADS: 1, scheduled for 2/22\par Colon Cancer 15 Feb 2019 *s Colonoscopy, repeat 3-5 years\par DEXA Jan 2021 -1.6\par HCV Testing 09 Oct 2018 HCV Interpretation: Nonreactive \par Pneumo (23) 02 Nov 2020 Pneumococcal polysaccharide vaccine, 23 valent \par Prevnar Aug 2021\par Td/Tdap 21 May 2015 Tdap \par Shingrix 9/1/21, will be getting 2nd soon\par \par \par

## 2022-02-14 NOTE — PHYSICAL EXAM
[No Acute Distress] : no acute distress [Well-Appearing] : well-appearing [Normal Sclera/Conjunctiva] : normal sclera/conjunctiva [No Respiratory Distress] : no respiratory distress  [Clear to Auscultation] : lungs were clear to auscultation bilaterally [Normal Rate] : normal rate  [Regular Rhythm] : with a regular rhythm [Normal S1, S2] : normal S1 and S2 [No Murmur] : no murmur heard [No Edema] : there was no peripheral edema [Soft] : abdomen soft [Non Tender] : non-tender [Normal Anterior Cervical Nodes] : no anterior cervical lymphadenopathy [Normal Supraclavicular Nodes] : no supraclavicular lymphadenopathy [No Rash] : no rash [de-identified] : mild tenderness to palpation along chest wall

## 2022-02-14 NOTE — ASSESSMENT
[FreeTextEntry1] : 65 y/o female for AWV\par \par HLD: on atorva 20, check labs\par \par GERD: H2 blocker - 40 famotidine\par Advised patient to stop ASA for now - has family hx of CAD but otherwise overall risk not high enough to warrant ASA prophlaxis in this patient with dyspepsia\par \par Trigger finger: Hand surgery referral\par \par Requests cardiology evaluation for CV risk assessment\par \par ACP:  daughter is HCP\par \par HCM:\par COVID vaccines completed x 3 2/24/21, 3/24/21, 11/17/21\par FLu shot Sep 2021\par Breast Cancer 11 Jan 2021 Mammogram Digital Screening, Bilat: BI-RADS: 1, scheduled for 2/22\par Colon Cancer 15 Feb 2019 *s Colonoscopy, repeat 3-5 years\par DEXA Jan 2021 -1.6\par HCV Testing 09 Oct 2018 HCV Interpretation: Nonreactive \par Pneumo (23) 02 Nov 2020 Pneumococcal polysaccharide vaccine, 23 valent \par Prevnar Aug 2021\par Td/Tdap 21 May 2015 Tdap \par Shingrix 9/1/21, will be getting 2nd soon\par \par \par \par \par

## 2022-02-14 NOTE — HEALTH RISK ASSESSMENT
[Never] : Never [No] : No [No falls in past year] : Patient reported no falls in the past year [With Patient/Caregiver] : , with patient/caregiver [Reviewed updated] : Reviewed, updated [Designated Healthcare Proxy] : Designated healthcare proxy [Name: ___] : Health Care Proxy's Name: [unfilled]  [Relationship: ___] : Relationship: [unfilled] [Change in mental status noted] : No change in mental status noted [None] : None [Retired] : retired [] :  [Feels Safe at Home] : Feels safe at home [Fully functional (bathing, dressing, toileting, transferring, walking, feeding)] : Fully functional (bathing, dressing, toileting, transferring, walking, feeding) [Fully functional (using the telephone, shopping, preparing meals, housekeeping, doing laundry, using] : Fully functional and needs no help or supervision to perform IADLs (using the telephone, shopping, preparing meals, housekeeping, doing laundry, using transportation, managing medications and managing finances) [Reports changes in hearing] : Reports no changes in hearing [Reports changes in vision] : Reports no changes in vision [Reports normal functional visual acuity (ie: able to read med bottle)] : Reports normal functional visual acuity [AdvancecareDate] : 02/14/2021

## 2022-02-16 ENCOUNTER — TRANSCRIPTION ENCOUNTER (OUTPATIENT)
Age: 67
End: 2022-02-16

## 2022-02-16 LAB
ALBUMIN SERPL ELPH-MCNC: 4.7 G/DL
ALP BLD-CCNC: 100 U/L
ALT SERPL-CCNC: 21 U/L
ANION GAP SERPL CALC-SCNC: 12 MMOL/L
AST SERPL-CCNC: 19 U/L
BASOPHILS # BLD AUTO: 0.04 K/UL
BASOPHILS NFR BLD AUTO: 0.7 %
BILIRUB SERPL-MCNC: 0.3 MG/DL
BUN SERPL-MCNC: 18 MG/DL
CALCIUM SERPL-MCNC: 9.2 MG/DL
CHLORIDE SERPL-SCNC: 107 MMOL/L
CHOLEST SERPL-MCNC: 174 MG/DL
CO2 SERPL-SCNC: 25 MMOL/L
CREAT SERPL-MCNC: 1.06 MG/DL
EOSINOPHIL # BLD AUTO: 0.22 K/UL
EOSINOPHIL NFR BLD AUTO: 3.9 %
ESTIMATED AVERAGE GLUCOSE: 117 MG/DL
GLUCOSE SERPL-MCNC: 87 MG/DL
HBA1C MFR BLD HPLC: 5.7 %
HCT VFR BLD CALC: 38.6 %
HDLC SERPL-MCNC: 58 MG/DL
HGB BLD-MCNC: 12.5 G/DL
IMM GRANULOCYTES NFR BLD AUTO: 0.2 %
LDLC SERPL CALC-MCNC: 94 MG/DL
LYMPHOCYTES # BLD AUTO: 2.79 K/UL
LYMPHOCYTES NFR BLD AUTO: 49.6 %
MAN DIFF?: NORMAL
MCHC RBC-ENTMCNC: 30 PG
MCHC RBC-ENTMCNC: 32.4 GM/DL
MCV RBC AUTO: 92.6 FL
MONOCYTES # BLD AUTO: 0.37 K/UL
MONOCYTES NFR BLD AUTO: 6.6 %
NEUTROPHILS # BLD AUTO: 2.2 K/UL
NEUTROPHILS NFR BLD AUTO: 39 %
NONHDLC SERPL-MCNC: 116 MG/DL
PLATELET # BLD AUTO: 218 K/UL
POTASSIUM SERPL-SCNC: 4.2 MMOL/L
PROT SERPL-MCNC: 7.2 G/DL
RBC # BLD: 4.17 M/UL
RBC # FLD: 13.2 %
SODIUM SERPL-SCNC: 145 MMOL/L
TRIGL SERPL-MCNC: 111 MG/DL
TSH SERPL-ACNC: 2.22 UIU/ML
WBC # FLD AUTO: 5.63 K/UL

## 2022-02-18 ENCOUNTER — APPOINTMENT (OUTPATIENT)
Dept: OBGYN | Facility: CLINIC | Age: 67
End: 2022-02-18

## 2022-02-22 ENCOUNTER — RESULT REVIEW (OUTPATIENT)
Age: 67
End: 2022-02-22

## 2022-02-22 ENCOUNTER — APPOINTMENT (OUTPATIENT)
Dept: ULTRASOUND IMAGING | Facility: CLINIC | Age: 67
End: 2022-02-22
Payer: MEDICARE

## 2022-02-22 ENCOUNTER — OUTPATIENT (OUTPATIENT)
Dept: OUTPATIENT SERVICES | Facility: HOSPITAL | Age: 67
LOS: 1 days | End: 2022-02-22
Payer: MEDICARE

## 2022-02-22 ENCOUNTER — APPOINTMENT (OUTPATIENT)
Dept: MAMMOGRAPHY | Facility: CLINIC | Age: 67
End: 2022-02-22
Payer: MEDICARE

## 2022-02-22 DIAGNOSIS — Z98.890 OTHER SPECIFIED POSTPROCEDURAL STATES: Chronic | ICD-10-CM

## 2022-02-22 DIAGNOSIS — M65.332 TRIGGER FINGER, LEFT MIDDLE FINGER: Chronic | ICD-10-CM

## 2022-02-22 DIAGNOSIS — Z98.51 TUBAL LIGATION STATUS: Chronic | ICD-10-CM

## 2022-02-22 DIAGNOSIS — Z00.00 ENCOUNTER FOR GENERAL ADULT MEDICAL EXAMINATION WITHOUT ABNORMAL FINDINGS: ICD-10-CM

## 2022-02-22 PROCEDURE — 77067 SCR MAMMO BI INCL CAD: CPT | Mod: 26

## 2022-02-22 PROCEDURE — 77063 BREAST TOMOSYNTHESIS BI: CPT | Mod: 26

## 2022-02-22 PROCEDURE — 77067 SCR MAMMO BI INCL CAD: CPT

## 2022-02-22 PROCEDURE — 77063 BREAST TOMOSYNTHESIS BI: CPT

## 2022-03-03 ENCOUNTER — APPOINTMENT (OUTPATIENT)
Dept: ORTHOPEDIC SURGERY | Facility: CLINIC | Age: 67
End: 2022-03-03
Payer: MEDICARE

## 2022-03-03 PROCEDURE — 99204 OFFICE O/P NEW MOD 45 MIN: CPT

## 2022-03-05 ENCOUNTER — RX RENEWAL (OUTPATIENT)
Age: 67
End: 2022-03-05

## 2022-03-07 NOTE — ASU DISCHARGE PLAN (ADULT/PEDIATRIC). - DISCHARGE TO
[facility] [place] MEDICINE PROGRESS NOTE   Patient: Pernell Conklin  Today's Date: 3/7/2022    YOB: 1940  Admission Date: 3/6/2022    MRN: [id number]  Inpatient LOS: 0    Room: [facility]/  Hospital Day: Hospital Day: 2    Subjective   HISTORY AND SUBJECTIVE COMPLAINTS     Chief Complaint:   Dizziness, paresthesia in the left arm    Interval History / Subjective:   The patient was seen and examined sitting up awake in bed.  I reviewed her history with her.  She denies ever having any chest pain or pressure, no dyspnea.  She says that yesterday, she was experiencing dizziness with no clear aggravating or relieving factors.  She was also having some tingling in her left arm with normal strength and without numbness.  She says all the symptoms have resolved.  She did have 1 episode of loose stool today    Hospital Course:  Pernell Conklin is a 81 year old female who presented on 3/6/2022 with complaints of Arm Pain, Dizziness, and Shortness of Breath  .    ROS:  Pertinent systems negative except as above.    Objective   PHYSICAL EXAMINATION     Vital 24 Hour Range Most Recent Value   Temperature Temp  Min: 97.5 °F (36.4 °C)  Max: 98 °F (36.7 °C) 98 °F (36.7 °C)   Pulse Pulse  Min: 63  Max: 80 80   Respiratory Resp  Min: 16  Max: 18 18   Blood Pressure BP  Min: 141/67  Max: 213/107 (!) 162/58   Pulse Oximetry SpO2  Min: 94 %  Max: 98 % 96 %   Arterial BP No data recorded     O2 No data recorded       Recorded Intake and Output:    Intake/Output Summary (Last 24 hours) at 3/7/2022 1415  Last data filed at 3/7/2022 1327  Gross per 24 hour   Intake 1354 ml   Output 1500 ml   Net -146 ml      Recorded Last Stool Occurrence: 1 (nonformed, brown) (03/07/22 1200)     Vital Most Recent Value First Value   Weight 64.5 kg (142 lb 3.2 oz) Weight: 70.8 kg (156 lb 1.4 oz)   Height 5' 2\" (157.5 cm) Height: 5' 2\" (157.5 cm)   BMI 26.01 N/A     General: Looks well well developed, well nourished and no  apparent distress  CV: regular rate and rhythm and no murmurs, rubs, or thrills  Resp: clear to auscultation bilaterally and diminished bilateral bases  Abd: soft, nontender, nondistended and bowel sounds  present  Ext: edema absent   Skin: no rashes, lesions, or ulcers noted and no induration, subcutaneous nodules, or tightening noted  Neuro: CN 2-12 grossly intact and no focal deficits noted  Psych: normal judgement and insight and oriented to time, place and person    TEST RESULTS     Labs: The Laboratory values listed below have been reviewed and pertinent findings discussed in the Assessment and Plan.    Laboratory values:   Recent Labs   Lab 03/07/22  0439 03/06/22  1036   WBC 6.9 8.4   HGB 13.5 14.2   HCT 40.4 42.8    213       Recent Labs   Lab 03/07/22  0439 03/06/22  1036 03/01/22  1048   SODIUM 142 138 142   POTASSIUM 4.0 4.1 4.5   CHLORIDE 107 101 105   CO2 25 27 26   CALCIUM 8.8 9.6 9.6   GLUCOSE 115* 133* 123*   BUN 26* 31* 31*   CREATININE 0.69 0.71 0.75          Radiology: Imaging studies have been reviewed and pertinent findings discussed in the Assessment and Plan.  Results for orders placed or performed during the hospital encounter of 03/06/22 (from the past 48 hour(s))   CT Chest PE Imaging    Impression    IMPRESSION:   No definite CT evidence for P.E  .    Study however reveals multiple almost innumerable noncalcific nodular  densities scattered within all the lobes of both lungs the largest  measuring approximately 12 mm located in the posteromedial aspect of the  left lower lobe. Findings raise possibility and are highly suspicious for  for pulmonary metastases although additional etiologies are not excluded.    Included portions of the upper abdomen raise possibility for  cholelithiasis/porcelain gallbladder. Ultrasonography will help in further  evaluation.       NM Myocardial Perfusion Rest/Stress Mult    Impression    IMPRESSION:    1. Normal myocardial perfusion study. No fixed  or reversible perfusion  defect.    2. Normal post-regadenoson LV systolic function.    3. Cardiac Risk Assessment: low     4. No previous study for comparison.      Rod Santos MD, FACC  [facility]        ANCILLARY ORDERS     Diet:  Regular Diet  Telemetry:    Consults:    IP CONSULT TO ONCOLOGY  Therapy Orders:   PT and OT Orders Placed this Encounter   Procedures   • Occupational Therapy   • Physical Therapy       Advance Care Planning    ADVANCED DIRECTIVES     Code Status: Full Resuscitation          ASSESSMENT AND PLAN     He 1-year-old female with history of tobacco abuse, type 2 diabetes, history of hypertension presenting with dizziness and left arm paresthesias.    Dizziness with left arm symptoms.  Patient denies chest pain but workup was pursued  -no PE. no MI. stress test without ischemia  -symptoms have resolved    Incidental finding of innumerable pulmonary nodules  -concern for malignancy.  -seen by Oncology.  Will check CT of the abdomen and pelvis.  -IR lung biopsy ordered.  Patient eager to be discharged following that    Uncontrolled hypertension  -patient does not check blood pressures at home but reports at doctor visits, usually normal range  -suspect exacerbated by stress, leg pain  -continue home medications.  Adjusted p.r.n. hydralazine dosing.      Smoking status: former smoker    Nutrition status: appropriate  Body mass index is 26.01 kg/m². - Overweight BMI 25-29  DVT Prophylaxis: Lovenox held for lung biopsy         DISCHARGE PLANNING     The patient's treatment plans were discussed with patient, RN and consultant(s).     Recommendations for Discharge   SW     PT     OT     SLP        Anticipated discharge destination: Home  Expected Discharge Date:  Tomorrow  Barriers to Discharge: Patient is not medically ready and needs to remain in the hospital today due to Pursuit of lung biopsy     Patient hopeful to be discharged after IR biopsy tomorrow , currently scheduled  for 1430        Jair Sheppard DO  Hospitalist  3/7/2022  2:15 PM      Addendum:  IR initially stated that patient would have to be off aspirin for 3 days prior to biopsy, therefore patient preferred to go home.  Her discharge was completed with plans for outpatient CT of the abdomen pelvis, IR biopsy.  In the meantime, IR physician sent me a message stating that they would not be able to do the biopsy as the 1 nodule which is large enough for biopsy is too close to the heart and is therefore high risk.  I discussed this development with the patient, she now prefers to stay.  CT the abdomen pelvis will be obtained to see if there is a safer site to biopsy.  If there is not, plan to discuss with pulmonology for possible biopsy via bronchoscopy.    Addendum:  Discussed with pulmonology on-call, Dr. Hudson.  Would be difficult to obtain biopsy via bronchoscopy.  Will await results of CT abdomen/pelvis.  If there is no good target on that scan, referral to Dr. Ortez for navigational bronchoscopy would be appropriate.  Alternative would be to repeat scan in about a month.   Home

## 2022-03-09 ENCOUNTER — APPOINTMENT (OUTPATIENT)
Dept: INTERNAL MEDICINE | Facility: CLINIC | Age: 67
End: 2022-03-09
Payer: MEDICARE

## 2022-03-09 ENCOUNTER — OUTPATIENT (OUTPATIENT)
Dept: OUTPATIENT SERVICES | Facility: HOSPITAL | Age: 67
LOS: 1 days | End: 2022-03-09
Payer: MEDICARE

## 2022-03-09 VITALS
RESPIRATION RATE: 16 BRPM | TEMPERATURE: 98 F | SYSTOLIC BLOOD PRESSURE: 122 MMHG | OXYGEN SATURATION: 98 % | HEART RATE: 87 BPM | HEIGHT: 61 IN | DIASTOLIC BLOOD PRESSURE: 84 MMHG | WEIGHT: 149.03 LBS

## 2022-03-09 VITALS
BODY MASS INDEX: 27.56 KG/M2 | DIASTOLIC BLOOD PRESSURE: 76 MMHG | WEIGHT: 146 LBS | HEIGHT: 61 IN | TEMPERATURE: 98.2 F | HEART RATE: 82 BPM | SYSTOLIC BLOOD PRESSURE: 128 MMHG | OXYGEN SATURATION: 96 %

## 2022-03-09 DIAGNOSIS — Z98.890 OTHER SPECIFIED POSTPROCEDURAL STATES: Chronic | ICD-10-CM

## 2022-03-09 DIAGNOSIS — Z98.51 TUBAL LIGATION STATUS: Chronic | ICD-10-CM

## 2022-03-09 DIAGNOSIS — M65.332 TRIGGER FINGER, LEFT MIDDLE FINGER: Chronic | ICD-10-CM

## 2022-03-09 DIAGNOSIS — Z01.818 ENCOUNTER FOR OTHER PREPROCEDURAL EXAMINATION: ICD-10-CM

## 2022-03-09 DIAGNOSIS — M65.331 TRIGGER FINGER, RIGHT MIDDLE FINGER: ICD-10-CM

## 2022-03-09 DIAGNOSIS — D16.9 BENIGN NEOPLASM OF BONE AND ARTICULAR CARTILAGE, UNSPECIFIED: ICD-10-CM

## 2022-03-09 LAB
ANION GAP SERPL CALC-SCNC: 12 MMOL/L — SIGNIFICANT CHANGE UP (ref 5–17)
BUN SERPL-MCNC: 18 MG/DL — SIGNIFICANT CHANGE UP (ref 7–23)
CALCIUM SERPL-MCNC: 9.7 MG/DL — SIGNIFICANT CHANGE UP (ref 8.4–10.5)
CHLORIDE SERPL-SCNC: 105 MMOL/L — SIGNIFICANT CHANGE UP (ref 96–108)
CO2 SERPL-SCNC: 24 MMOL/L — SIGNIFICANT CHANGE UP (ref 22–31)
CREAT SERPL-MCNC: 0.77 MG/DL — SIGNIFICANT CHANGE UP (ref 0.5–1.3)
EGFR: 84 ML/MIN/1.73M2 — SIGNIFICANT CHANGE UP
GLUCOSE SERPL-MCNC: 87 MG/DL — SIGNIFICANT CHANGE UP (ref 70–99)
HCT VFR BLD CALC: 39.7 % — SIGNIFICANT CHANGE UP (ref 34.5–45)
HGB BLD-MCNC: 12.8 G/DL — SIGNIFICANT CHANGE UP (ref 11.5–15.5)
MCHC RBC-ENTMCNC: 29.8 PG — SIGNIFICANT CHANGE UP (ref 27–34)
MCHC RBC-ENTMCNC: 32.2 GM/DL — SIGNIFICANT CHANGE UP (ref 32–36)
MCV RBC AUTO: 92.5 FL — SIGNIFICANT CHANGE UP (ref 80–100)
NRBC # BLD: 0 /100 WBCS — SIGNIFICANT CHANGE UP (ref 0–0)
PLATELET # BLD AUTO: 217 K/UL — SIGNIFICANT CHANGE UP (ref 150–400)
POTASSIUM SERPL-MCNC: 3.9 MMOL/L — SIGNIFICANT CHANGE UP (ref 3.5–5.3)
POTASSIUM SERPL-SCNC: 3.9 MMOL/L — SIGNIFICANT CHANGE UP (ref 3.5–5.3)
RBC # BLD: 4.29 M/UL — SIGNIFICANT CHANGE UP (ref 3.8–5.2)
RBC # FLD: 12.7 % — SIGNIFICANT CHANGE UP (ref 10.3–14.5)
SODIUM SERPL-SCNC: 141 MMOL/L — SIGNIFICANT CHANGE UP (ref 135–145)
WBC # BLD: 7.3 K/UL — SIGNIFICANT CHANGE UP (ref 3.8–10.5)
WBC # FLD AUTO: 7.3 K/UL — SIGNIFICANT CHANGE UP (ref 3.8–10.5)

## 2022-03-09 PROCEDURE — 36415 COLL VENOUS BLD VENIPUNCTURE: CPT

## 2022-03-09 PROCEDURE — G0463: CPT

## 2022-03-09 PROCEDURE — 99214 OFFICE O/P EST MOD 30 MIN: CPT

## 2022-03-09 PROCEDURE — 85027 COMPLETE CBC AUTOMATED: CPT

## 2022-03-09 PROCEDURE — 80048 BASIC METABOLIC PNL TOTAL CA: CPT

## 2022-03-09 RX ORDER — OMEPRAZOLE 10 MG/1
1 CAPSULE, DELAYED RELEASE ORAL
Qty: 0 | Refills: 0 | DISCHARGE

## 2022-03-09 RX ORDER — SODIUM CHLORIDE 9 MG/ML
1000 INJECTION, SOLUTION INTRAVENOUS
Refills: 0 | Status: DISCONTINUED | OUTPATIENT
Start: 2022-03-21 | End: 2022-04-04

## 2022-03-09 NOTE — H&P PST ADULT - NSICDXPASTMEDICALHX_GEN_ALL_CORE_FT
PAST MEDICAL HISTORY:  Carpal tunnel syndrome Bilateral : surgically treated '04 and '15    GERD (gastroesophageal reflux disease)     Hyperlipidemia     Other intervertebral disc displacement, lumbar region L4-L5    Trigger finger Left Middle Finger, Right Middle Finger    Varicose vein of leg 1/2017:  Right Leg: surgically treated

## 2022-03-09 NOTE — H&P PST ADULT - NSICDXFAMILYHX_GEN_ALL_CORE_FT
FAMILY HISTORY:  Father  Still living? No  Family history of cardiac disorder, Age at diagnosis: Age Unknown    Mother  Still living? No  Family history of cancer, Age at diagnosis: Age Unknown

## 2022-03-09 NOTE — H&P PST ADULT - NSICDXPASTSURGICALHX_GEN_ALL_CORE_FT
PAST SURGICAL HISTORY:  H/O tubal ligation ' 1985    H/O varicose vein stripping 1/2017, 6/2018, Right Leg    S/p bilateral carpal tunnel release ' 2004:  Left    ' 2015:  Right    S/P lumbar laminectomy - L4-5 posterior bilateral lumbar laminectomy and discectomy, 7/24/2018      Trigger finger, left middle finger release, 7/07/2017

## 2022-03-09 NOTE — HISTORY OF PRESENT ILLNESS
[No Pertinent Cardiac History] : no history of aortic stenosis, atrial fibrillation, coronary artery disease, recent myocardial infarction, or implantable device/pacemaker [No Pertinent Pulmonary History] : no history of asthma, COPD, sleep apnea, or smoking [No Adverse Anesthesia Reaction] : no adverse anesthesia reaction in self or family member [(Patient denies any chest pain, claudication, dyspnea on exertion, orthopnea, palpitations or syncope)] : Patient denies any chest pain, claudication, dyspnea on exertion, orthopnea, palpitations or syncope [Good (7-10 METs)] : Good (7-10 METs) [FreeTextEntry1] : Right middle finger trigger release [FreeTextEntry2] : 3/21 [FreeTextEntry3] : Dr. Aldana [FreeTextEntry4] : 68 y/o female here for preop assessment prior to right hand surgery\par \par Saw cardiology (Dr. Bartlett) for stress testing and echo recently, normal as per patient..  No symptoms.   [FreeTextEntry7] : Stress Testing \par Echo

## 2022-03-09 NOTE — ASSESSMENT
[High Risk Surgery - Intraperitoneal, Intrathoracic or Supringuinal Vascular Procedures] : High Risk Surgery - Intraperitoneal, Intrathoracic or Supringuinal Vascular Procedures - No (0) [Ischemic Heart Disease] : Ischemic Heart Disease - No (0) [Congestive Heart Failure] : Congestive Heart Failure - No (0) [Prior Cerebrovascular Accident or TIA] : Prior Cerebrovascular Accident or TIA - No (0) [Creatinine >= 2mg/dL (1 Point)] : Creatinine >= 2mg/dL - No (0) [Insulin-dependent Diabetic (1 Point)] : Insulin-dependent Diabetic - No (0) [0] : 0 , RCRI Class: I, Risk of Post-Op Cardiac Complications: 3.9%, 95% CI for Risk Estimate: 2.8% - 5.4% [FreeTextEntry4] : Patient is acceptable risk for the planned low risk procedure.  \par Patient has no active cardiac conditions and acceptable METs (>4).  \par Recent cardiac testing normal\par Labs to be done with PST this evening\par May proceed with planned surgery without additional workup pending normal labs.

## 2022-03-09 NOTE — PHYSICAL EXAM
[No Acute Distress] : no acute distress [Normal Sclera/Conjunctiva] : normal sclera/conjunctiva [No Respiratory Distress] : no respiratory distress  [Clear to Auscultation] : lungs were clear to auscultation bilaterally [Normal Rate] : normal rate  [Regular Rhythm] : with a regular rhythm [Normal S1, S2] : normal S1 and S2

## 2022-03-09 NOTE — H&P PST ADULT - MUSCULOSKELETAL
Right middle finger triggering/ROM intact/no joint swelling/normal strength/decreased ROM detailed exam details…

## 2022-03-09 NOTE — H&P PST ADULT - HISTORY OF PRESENT ILLNESS
68 y/o female with PMHx of HLD, GERD, herniated lumbar disc s/p  L4-5 posterior bilateral lumbar laminectomy and discectomy (7/24/2018), b/l carpal tunnel s/p repair (2004, 2015), Left middle finger trigger release (07/07/2017) c/o Right middle finger stiffness, triggering for 3 months. She is scheduled for Right Middle Finger Trigger Release on 03/21/2022.     Covid testing 03/18/22. Denies current symptoms or recent exposure.

## 2022-03-09 NOTE — H&P PST ADULT - FALL HARM RISK - UNIVERSAL INTERVENTIONS
Bed in lowest position, wheels locked, appropriate side rails in place/Call bell, personal items and telephone in reach/Instruct patient to call for assistance before getting out of bed or chair/Non-slip footwear when patient is out of bed/[place] to call system/Physically safe environment - no spills, clutter or unnecessary equipment/Purposeful Proactive Rounding/Room/bathroom lighting operational, light cord in reach

## 2022-03-18 ENCOUNTER — OUTPATIENT (OUTPATIENT)
Dept: OUTPATIENT SERVICES | Facility: HOSPITAL | Age: 67
LOS: 1 days | End: 2022-03-18
Payer: COMMERCIAL

## 2022-03-18 DIAGNOSIS — Z11.52 ENCOUNTER FOR SCREENING FOR COVID-19: ICD-10-CM

## 2022-03-18 DIAGNOSIS — Z98.890 OTHER SPECIFIED POSTPROCEDURAL STATES: Chronic | ICD-10-CM

## 2022-03-18 DIAGNOSIS — M65.332 TRIGGER FINGER, LEFT MIDDLE FINGER: Chronic | ICD-10-CM

## 2022-03-18 DIAGNOSIS — Z98.51 TUBAL LIGATION STATUS: Chronic | ICD-10-CM

## 2022-03-18 LAB — SARS-COV-2 RNA SPEC QL NAA+PROBE: SIGNIFICANT CHANGE UP

## 2022-03-18 PROCEDURE — U0003: CPT

## 2022-03-18 PROCEDURE — C9803: CPT

## 2022-03-18 PROCEDURE — U0005: CPT

## 2022-03-20 ENCOUNTER — TRANSCRIPTION ENCOUNTER (OUTPATIENT)
Age: 67
End: 2022-03-20

## 2022-03-21 ENCOUNTER — OUTPATIENT (OUTPATIENT)
Dept: OUTPATIENT SERVICES | Facility: HOSPITAL | Age: 67
LOS: 1 days | End: 2022-03-21
Payer: MEDICARE

## 2022-03-21 ENCOUNTER — APPOINTMENT (OUTPATIENT)
Dept: ORTHOPEDIC SURGERY | Facility: HOSPITAL | Age: 67
End: 2022-03-21

## 2022-03-21 VITALS
OXYGEN SATURATION: 98 % | HEART RATE: 63 BPM | RESPIRATION RATE: 18 BRPM | SYSTOLIC BLOOD PRESSURE: 142 MMHG | DIASTOLIC BLOOD PRESSURE: 67 MMHG

## 2022-03-21 VITALS
RESPIRATION RATE: 16 BRPM | SYSTOLIC BLOOD PRESSURE: 128 MMHG | HEIGHT: 61 IN | DIASTOLIC BLOOD PRESSURE: 77 MMHG | HEART RATE: 77 BPM | OXYGEN SATURATION: 100 % | TEMPERATURE: 98 F | WEIGHT: 149.03 LBS

## 2022-03-21 DIAGNOSIS — Z98.890 OTHER SPECIFIED POSTPROCEDURAL STATES: Chronic | ICD-10-CM

## 2022-03-21 DIAGNOSIS — D16.9 BENIGN NEOPLASM OF BONE AND ARTICULAR CARTILAGE, UNSPECIFIED: ICD-10-CM

## 2022-03-21 DIAGNOSIS — M65.332 TRIGGER FINGER, LEFT MIDDLE FINGER: Chronic | ICD-10-CM

## 2022-03-21 DIAGNOSIS — M65.331 TRIGGER FINGER, RIGHT MIDDLE FINGER: ICD-10-CM

## 2022-03-21 DIAGNOSIS — Z98.51 TUBAL LIGATION STATUS: Chronic | ICD-10-CM

## 2022-03-21 PROCEDURE — 26055 INCISE FINGER TENDON SHEATH: CPT | Mod: F7

## 2022-03-21 RX ORDER — FENTANYL CITRATE 50 UG/ML
25 INJECTION INTRAVENOUS
Refills: 0 | Status: DISCONTINUED | OUTPATIENT
Start: 2022-03-21 | End: 2022-03-21

## 2022-03-21 RX ORDER — CHLORHEXIDINE GLUCONATE 213 G/1000ML
1 SOLUTION TOPICAL ONCE
Refills: 0 | Status: COMPLETED | OUTPATIENT
Start: 2022-03-21 | End: 2022-03-21

## 2022-03-21 RX ORDER — ONDANSETRON 8 MG/1
4 TABLET, FILM COATED ORAL ONCE
Refills: 0 | Status: DISCONTINUED | OUTPATIENT
Start: 2022-03-21 | End: 2022-04-04

## 2022-03-21 RX ORDER — OXYCODONE HYDROCHLORIDE 5 MG/1
5 TABLET ORAL ONCE
Refills: 0 | Status: DISCONTINUED | OUTPATIENT
Start: 2022-03-21 | End: 2022-03-21

## 2022-03-21 RX ORDER — LIDOCAINE HCL 20 MG/ML
0.2 VIAL (ML) INJECTION ONCE
Refills: 0 | Status: COMPLETED | OUTPATIENT
Start: 2022-03-21 | End: 2022-03-21

## 2022-03-21 RX ADMIN — SODIUM CHLORIDE 100 MILLILITER(S): 9 INJECTION, SOLUTION INTRAVENOUS at 12:17

## 2022-03-21 RX ADMIN — CHLORHEXIDINE GLUCONATE 1 APPLICATION(S): 213 SOLUTION TOPICAL at 12:17

## 2022-03-21 NOTE — ASU DISCHARGE PLAN (ADULT/PEDIATRIC) - CARE PROVIDER_API CALL
Candice Aldana (MD; MPH)  Orthopaedic Surgery  [address], [address]  [place] [address]  Phone: [phone]  Fax: [phone]  Follow Up Time:

## 2022-03-21 NOTE — ASU DISCHARGE PLAN (ADULT/PEDIATRIC) - NS MD DC FALL RISK RISK
For information on Fall & Injury Prevention, visit: [email or web address] OR  [email or web address] OR  https://www.cdc.gov/steadi/patient.html

## 2022-03-21 NOTE — ASU PATIENT PROFILE, ADULT - NS PRO TALK SOMEONE YN
Anesthesia Consult and Med Hx


Date of service: 11/14/19





- Airway


Anesthetic Teeth Evaluation: Good


ROM Head & Neck: Adequate


Mental/Hyoid Distance: Adequate


Mallampati Class: Class II


Intubation Access Assessment: Probably Good





- Pulmonary Exam


CTA: Yes





- Cardiac Exam


Cardiac Exam: RRR





- Pre-Operative Health Status


ASA Pre-Surgery Classification: ASA2


Proposed Anesthetic Plan: Spinal





- Pre-Anesthesia Comment


Pre-Anesthesia Comments: PSH: CSECTION X 1, NO ANESTHESIA COMPLICATIONS





- Pulmonary


Hx Smoking: No


Hx Asthma: No


Hx Respiratory Symptoms: No


SOB: No


COPD: No


Home Oxygen Therapy: No


Hx Pneumonia: No


Hx Sleep Apnea: No





- Cardiovascular System


Hx Hypertension: No


Hx Coronary Artery Disease: No


Hx Heart Attack/AMI: No


Hx Angina: No


Hx Percutaneous Transluminal Coronary Angioplasty (PTCA): No


Hx Cardia Arrhythmia: No


Hx Pacemaker: No


Hx Internal Defibrillator: No


Hx Valvular Heart Disease: No


Hx Heart Murmur: No


Hx Peripheral Vascular Disease: No





- Central Nervous System


Hx Neuromuscular Disorder: No


Hx Seizures: No


CVA: No


Hx Back Pain: No


Hx Psychiatric Problems: No





- Gastrointestinal


Hx Ulcer: No


Hx Gastroesophageal Reflux Disease: No





- Endocrine


Hx Renal Disease: No


Hx End Stage Renal Disease: No


Hx Cirrhosis: No


Hx Liver Disease: No


Hx Insulin Dependent Diabetes: No


Hx Non-Insulin Dependent Diabetes: No


Hx Thyroid Disease: No


Hx Hypothyroidism: No


Hx Hyperthyroidism: No





- Hematic


Hx Anemia: No


Hx Sickle Cell Disease: No





- Other Systems


Hx Alcohol Use: No


Hx Substance Use: No


Hx Cancer: No


Hx Obesity: No no

## 2022-03-21 NOTE — ASU DISCHARGE PLAN (ADULT/PEDIATRIC) - NURSING INSTRUCTIONS
Tylenol/acetaminophen------AND/OR------Motrin/ibuprofen  as needed for  pain/discomfort.  NEXT DOSES:   Tylenol  AND/OR  Motrin  OK @  7:30pm this evening, if needed.  Please read and follow preprinted, MD-specific instruction sheet provided, (purple pamphlet).  Follow up with MD as requested; call office for appointment.

## 2022-03-21 NOTE — ASU PATIENT PROFILE, ADULT - FALL HARM RISK - UNIVERSAL INTERVENTIONS
Bed in lowest position, wheels locked, appropriate side rails in place/Call bell, personal items and telephone in reach/Instruct patient to call for assistance before getting out of bed or chair/Non-slip footwear when patient is out of bed/[place] to call system/Physically safe environment - no spills, clutter or unnecessary equipment/Purposeful Proactive Rounding/Room/bathroom lighting operational, light cord in reach

## 2022-04-01 ENCOUNTER — APPOINTMENT (OUTPATIENT)
Dept: ORTHOPEDIC SURGERY | Facility: CLINIC | Age: 67
End: 2022-04-01
Payer: MEDICARE

## 2022-04-01 PROBLEM — M65.30 TRIGGER FINGER, UNSPECIFIED FINGER: Chronic | Status: ACTIVE | Noted: 2017-07-03

## 2022-04-01 PROBLEM — M51.26 OTHER INTERVERTEBRAL DISC DISPLACEMENT, LUMBAR REGION: Chronic | Status: ACTIVE | Noted: 2018-07-20

## 2022-04-01 PROCEDURE — 99024 POSTOP FOLLOW-UP VISIT: CPT

## 2022-04-06 ENCOUNTER — APPOINTMENT (OUTPATIENT)
Dept: GASTROENTEROLOGY | Facility: CLINIC | Age: 67
End: 2022-04-06
Payer: MEDICARE

## 2022-04-06 VITALS
HEART RATE: 76 BPM | DIASTOLIC BLOOD PRESSURE: 72 MMHG | HEIGHT: 60 IN | BODY MASS INDEX: 29.06 KG/M2 | OXYGEN SATURATION: 98 % | SYSTOLIC BLOOD PRESSURE: 128 MMHG | WEIGHT: 148 LBS

## 2022-04-06 DIAGNOSIS — Z86.010 PERSONAL HISTORY OF COLONIC POLYPS: ICD-10-CM

## 2022-04-06 PROCEDURE — 99213 OFFICE O/P EST LOW 20 MIN: CPT

## 2022-04-06 NOTE — ASSESSMENT
[FreeTextEntry1] : This is a 67-year-old female with history of chronic GERD and adenomatous colon polyp.  I recommend an upper endoscopy and a colonoscopy.  I explained to her the risks, alternatives and benefits of both procedures.  Risk including but not limited to bleeding, perforation, infection and adverse medication reaction.  Questions were answered.  She stated understanding.

## 2022-04-06 NOTE — HISTORY OF PRESENT ILLNESS
[FreeTextEntry1] : Chata presents for follow-up visit.  She reports continued heartburn symptoms for which she takes pantoprazole 40 mg in the morning and famotidine at bedtime.  She has a history of adenomatous colon polyp by colonoscopy 3 years ago.  She wants a repeat colonoscopy.  She denies changes in bowel habits.  She denies rectal bleeding or melena.  She denies weight loss.

## 2022-04-11 PROBLEM — Z11.59 SCREENING FOR VIRAL DISEASE: Status: RESOLVED | Noted: 2021-01-25 | Resolved: 2022-02-14

## 2022-05-03 ENCOUNTER — APPOINTMENT (OUTPATIENT)
Dept: ORTHOPEDIC SURGERY | Facility: CLINIC | Age: 67
End: 2022-05-03
Payer: MEDICARE

## 2022-05-03 PROCEDURE — 99024 POSTOP FOLLOW-UP VISIT: CPT

## 2022-06-20 ENCOUNTER — APPOINTMENT (OUTPATIENT)
Dept: RHEUMATOLOGY | Facility: CLINIC | Age: 67
End: 2022-06-20

## 2022-06-20 VITALS
HEIGHT: 60 IN | OXYGEN SATURATION: 98 % | TEMPERATURE: 97.7 F | BODY MASS INDEX: 30.04 KG/M2 | SYSTOLIC BLOOD PRESSURE: 135 MMHG | DIASTOLIC BLOOD PRESSURE: 88 MMHG | WEIGHT: 153 LBS | HEART RATE: 83 BPM

## 2022-06-20 PROCEDURE — 99203 OFFICE O/P NEW LOW 30 MIN: CPT

## 2022-06-20 RX ORDER — LOSARTAN POTASSIUM AND HYDROCHLOROTHIAZIDE 12.5; 5 MG/1; MG/1
50-12.5 TABLET ORAL
Qty: 30 | Refills: 0 | Status: ACTIVE | COMMUNITY
Start: 2022-05-16

## 2022-06-24 LAB
ALBUMIN SERPL ELPH-MCNC: 4.5 G/DL
ALP BLD-CCNC: 88 U/L
ALT SERPL-CCNC: 29 U/L
ANA SER IF-ACNC: NEGATIVE
ANION GAP SERPL CALC-SCNC: 13 MMOL/L
AST SERPL-CCNC: 30 U/L
BASOPHILS # BLD AUTO: 0.04 K/UL
BASOPHILS NFR BLD AUTO: 0.7 %
BILIRUB SERPL-MCNC: 0.3 MG/DL
BUN SERPL-MCNC: 11 MG/DL
CALCIUM SERPL-MCNC: 9.5 MG/DL
CCP AB SER IA-ACNC: <8 UNITS
CHLORIDE SERPL-SCNC: 107 MMOL/L
CK SERPL-CCNC: 109 U/L
CO2 SERPL-SCNC: 25 MMOL/L
CREAT SERPL-MCNC: 0.84 MG/DL
CRP SERPL-MCNC: <3 MG/L
EGFR: 76 ML/MIN/1.73M2
EOSINOPHIL # BLD AUTO: 0.3 K/UL
EOSINOPHIL NFR BLD AUTO: 5.4 %
ERYTHROCYTE [SEDIMENTATION RATE] IN BLOOD BY WESTERGREN METHOD: 2 MM/HR
GLUCOSE SERPL-MCNC: 81 MG/DL
HCT VFR BLD CALC: 34.4 %
HGB BLD-MCNC: 11.1 G/DL
IMM GRANULOCYTES NFR BLD AUTO: 0.2 %
LYMPHOCYTES # BLD AUTO: 2.48 K/UL
LYMPHOCYTES NFR BLD AUTO: 45 %
MAN DIFF?: NORMAL
MCHC RBC-ENTMCNC: 30.1 PG
MCHC RBC-ENTMCNC: 32.3 GM/DL
MCV RBC AUTO: 93.2 FL
MONOCYTES # BLD AUTO: 0.55 K/UL
MONOCYTES NFR BLD AUTO: 10 %
NEUTROPHILS # BLD AUTO: 2.13 K/UL
NEUTROPHILS NFR BLD AUTO: 38.7 %
PLATELET # BLD AUTO: 224 K/UL
POTASSIUM SERPL-SCNC: 4.3 MMOL/L
PROT SERPL-MCNC: 6.9 G/DL
RBC # BLD: 3.69 M/UL
RBC # FLD: 13.3 %
RF+CCP IGG SER-IMP: NEGATIVE
RHEUMATOID FACT SER QL: <10 IU/ML
SODIUM SERPL-SCNC: 145 MMOL/L
THYROGLOB AB SERPL-ACNC: <20 IU/ML
THYROPEROXIDASE AB SERPL IA-ACNC: <10 IU/ML
WBC # FLD AUTO: 5.51 K/UL

## 2022-07-08 ENCOUNTER — LABORATORY RESULT (OUTPATIENT)
Age: 67
End: 2022-07-08

## 2022-07-13 DIAGNOSIS — Z01.818 ENCOUNTER FOR OTHER PREPROCEDURAL EXAMINATION: ICD-10-CM

## 2022-07-14 ENCOUNTER — APPOINTMENT (OUTPATIENT)
Dept: GASTROENTEROLOGY | Facility: HOSPITAL | Age: 67
End: 2022-07-14

## 2022-07-14 ENCOUNTER — OUTPATIENT (OUTPATIENT)
Dept: OUTPATIENT SERVICES | Facility: HOSPITAL | Age: 67
LOS: 1 days | End: 2022-07-14
Payer: MEDICARE

## 2022-07-14 ENCOUNTER — TRANSCRIPTION ENCOUNTER (OUTPATIENT)
Age: 67
End: 2022-07-14

## 2022-07-14 ENCOUNTER — RESULT REVIEW (OUTPATIENT)
Age: 67
End: 2022-07-14

## 2022-07-14 VITALS
OXYGEN SATURATION: 99 % | HEIGHT: 61 IN | HEART RATE: 65 BPM | RESPIRATION RATE: 15 BRPM | DIASTOLIC BLOOD PRESSURE: 70 MMHG | TEMPERATURE: 97 F | WEIGHT: 147.93 LBS | SYSTOLIC BLOOD PRESSURE: 121 MMHG

## 2022-07-14 VITALS
HEART RATE: 56 BPM | RESPIRATION RATE: 14 BRPM | OXYGEN SATURATION: 99 % | DIASTOLIC BLOOD PRESSURE: 67 MMHG | SYSTOLIC BLOOD PRESSURE: 103 MMHG

## 2022-07-14 DIAGNOSIS — Z98.890 OTHER SPECIFIED POSTPROCEDURAL STATES: Chronic | ICD-10-CM

## 2022-07-14 DIAGNOSIS — Z98.51 TUBAL LIGATION STATUS: Chronic | ICD-10-CM

## 2022-07-14 DIAGNOSIS — K21.9 GASTRO-ESOPHAGEAL REFLUX DISEASE WITHOUT ESOPHAGITIS: ICD-10-CM

## 2022-07-14 DIAGNOSIS — M65.332 TRIGGER FINGER, LEFT MIDDLE FINGER: Chronic | ICD-10-CM

## 2022-07-14 DIAGNOSIS — Z86.010 PERSONAL HISTORY OF COLONIC POLYPS: ICD-10-CM

## 2022-07-14 LAB — SARS-COV-2 N GENE NPH QL NAA+PROBE: NOT DETECTED

## 2022-07-14 PROCEDURE — 43239 EGD BIOPSY SINGLE/MULTIPLE: CPT

## 2022-07-14 PROCEDURE — 45380 COLONOSCOPY AND BIOPSY: CPT | Mod: GC

## 2022-07-14 PROCEDURE — 43239 EGD BIOPSY SINGLE/MULTIPLE: CPT | Mod: GC

## 2022-07-14 PROCEDURE — 88305 TISSUE EXAM BY PATHOLOGIST: CPT | Mod: 26

## 2022-07-14 PROCEDURE — 88305 TISSUE EXAM BY PATHOLOGIST: CPT

## 2022-07-14 PROCEDURE — 45380 COLONOSCOPY AND BIOPSY: CPT | Mod: PT

## 2022-07-14 DEVICE — NET RETRV ROT ROTH 2.5MMX230CM: Type: IMPLANTABLE DEVICE | Status: FUNCTIONAL

## 2022-07-14 RX ORDER — SODIUM CHLORIDE 9 MG/ML
500 INJECTION INTRAMUSCULAR; INTRAVENOUS; SUBCUTANEOUS
Refills: 0 | Status: DISCONTINUED | OUTPATIENT
Start: 2022-07-14 | End: 2022-07-28

## 2022-07-14 NOTE — PRE PROCEDURE NOTE - PRE PROCEDURE EVALUATION
Attending Physician:             Jenni Rodríguez               Procedure: upper endoscopy, colonoscopy    Indication for Procedure: GERD, hx of colon polyps  ________________________________________________________  PAST MEDICAL & SURGICAL HISTORY:  Hyperlipidemia      GERD (gastroesophageal reflux disease)      Varicose vein of leg  1/2017:  Right Leg: surgically treated      Carpal tunnel syndrome  Bilateral : surgically treated &#x27;04 and &#x27;15      Trigger finger  Left Middle Finger, Right Middle Finger      Other intervertebral disc displacement, lumbar region  L4-L5      H/O tubal ligation  &#x27; 1985      S/p bilateral carpal tunnel release  &#x27; 2004:  Left    &#x27; 2015:  Right      H/O varicose vein stripping  1/2017, 6/2018, Right Leg      Trigger finger, left middle finger  release, 7/07/2017      S/P lumbar laminectomy  - L4-5 posterior bilateral lumbar laminectomy and discectomy, 7/24/2018          ALLERGIES:  No Known Allergies    HOME MEDICATIONS:  atorvastatin 20 mg oral tablet: 1 tab(s) orally once a day (at bedtime)  famotidine 40 mg oral tablet: 1 tab(s) orally once a day (at bedtime)  losartan-hydrochlorothiazide 50 mg-12.5 mg oral tablet: 1 tab(s) orally once a day  pantoprazole 40 mg oral delayed release tablet: 1 tab(s) orally once a day    AICD/PPM: [ ] yes   [ ] no    PERTINENT LAB DATA:                      PHYSICAL EXAMINATION:    Height (cm): 154.9  Weight (kg): 67.1  BMI (kg/m2): 28  BSA (m2): 1.66T(C): 36.2  HR: 65  BP: 121/70  RR: 15  SpO2: 99%    Constitutional: NAD  HEENT: PERRLA, EOMI,    Neck:  No JVD  Respiratory: CTAB/L  Cardiovascular: S1 and S2  Gastrointestinal: BS+, soft, NT/ND  Extremities: No peripheral edema  Neurological: A/O x 3, no focal deficits  Psychiatric: Normal mood, normal affect  Skin: No rashes    ASA Class: I [ ]  II [ ]  III [ ]  IV [ ]    COMMENTS:    The patient is a suitable candidate for the planned procedure unless box checked [ ]  No, explain:

## 2022-07-14 NOTE — ASU DISCHARGE PLAN (ADULT/PEDIATRIC) - WILL THE PATIENT ACCEPT THE PFIZER COVID-19 VACCINE IF ELIGIBLE AND IT IS AVAILABLE?
Physical Therapy  Cancellation/No-show Note  Patient Name:  Rosa Elena Pickett  :  1974   Date:  2020  Cancelled visits to date: 0  No-shows to date: 1    For today's appointment patient:  []  Cancelled  []  Rescheduled appointment  [x]  No-show     Reason given by patient:  []  Patient ill  []  Conflicting appointment  []  No transportation    []  Conflict with work  []  No reason given  []  Other:     Comments:      Electronically signed by:  Mary Beth Mishra PT Not applicable

## 2022-07-14 NOTE — ASU PREOP CHECKLIST - SITE MARKED BY SURGEON
Body Location Override (Optional - Billing Will Still Be Based On Selected Body Map Location If Applicable): left frontal scalp Detail Level: Detailed Depth Of Biopsy: dermis Was A Bandage Applied: Yes Size Of Lesion In Cm: 0 Biopsy Type: H and E Biopsy Method: Dermablade Anesthesia Type: 1% lidocaine with epinephrine Anesthesia Volume In Cc: 0.5 Additional Anesthesia Volume In Cc (Will Not Render If 0): 6 Hemostasis: Drysol Wound Care: Bacitracin Dressing: bandage Destruction After The Procedure: No Type Of Destruction Used: Curettage Curettage Text: The wound bed was treated with curettage after the biopsy was performed. Cryotherapy Text: The wound bed was treated with cryotherapy after the biopsy was performed. Electrodesiccation Text: The wound bed was treated with electrodesiccation after the biopsy was performed. Electrodesiccation And Curettage Text: The wound bed was treated with electrodesiccation and curettage after the biopsy was performed. Silver Nitrate Text: The wound bed was treated with silver nitrate after the biopsy was performed. Lab: [id number] Lab Facility: [id number] Consent: Written consent was obtained and risks were reviewed including but not limited to scarring, infection, bleeding, scabbing, incomplete removal, nerve damage and allergy to anesthesia. Post-Care Instructions: I reviewed with the patient in detail post-care instructions. Patient is to keep the biopsy site dry overnight, and then apply bacitracin twice daily until healed. Patient may apply hydrogen peroxide soaks to remove any crusting. Notification Instructions: Patient will be notified of biopsy results. However, patient instructed to call the office if not contacted within 2 weeks. Results will be faxed to PCP when they are available. Billing Type: Third-Party Bill n/a

## 2022-07-14 NOTE — ASU DISCHARGE PLAN (ADULT/PEDIATRIC) - NS MD DC FALL RISK RISK
For information on Fall & Injury Prevention, visit: [email or web address] OR  [email or web address] OR  https://www.cdc.gov/steadi/patient.html

## 2022-07-14 NOTE — ASU PATIENT PROFILE, ADULT - FALL HARM RISK - UNIVERSAL INTERVENTIONS
Bed in lowest position, wheels locked, appropriate side rails in place/Call bell, personal items and telephone in reach/Instruct patient to call for assistance before getting out of bed or chair/Non-slip footwear when patient is out of bed/[place] to call system/Physically safe environment - no spills, clutter or unnecessary equipment/Purposeful Proactive Rounding/Room/bathroom lighting operational, light cord in reach

## 2022-07-15 LAB — SURGICAL PATHOLOGY STUDY: SIGNIFICANT CHANGE UP

## 2022-08-22 ENCOUNTER — RX RENEWAL (OUTPATIENT)
Age: 67
End: 2022-08-22

## 2022-08-29 ENCOUNTER — APPOINTMENT (OUTPATIENT)
Dept: INTERNAL MEDICINE | Facility: CLINIC | Age: 67
End: 2022-08-29

## 2022-08-29 VITALS
DIASTOLIC BLOOD PRESSURE: 78 MMHG | HEART RATE: 86 BPM | SYSTOLIC BLOOD PRESSURE: 128 MMHG | WEIGHT: 153 LBS | OXYGEN SATURATION: 98 % | HEIGHT: 60 IN | TEMPERATURE: 98.3 F | BODY MASS INDEX: 30.04 KG/M2

## 2022-08-29 PROCEDURE — 99214 OFFICE O/P EST MOD 30 MIN: CPT | Mod: 25

## 2022-08-29 PROCEDURE — 36415 COLL VENOUS BLD VENIPUNCTURE: CPT

## 2022-08-29 RX ORDER — HYDROCHLOROTHIAZIDE 12.5 MG/1
12.5 TABLET ORAL
Qty: 30 | Refills: 0 | Status: DISCONTINUED | COMMUNITY
Start: 2022-04-13 | End: 2022-08-29

## 2022-08-29 NOTE — HISTORY OF PRESENT ILLNESS
[de-identified] : 66 y/o female for f/u\par \par Had EGD/CLS with Chuck\par -esophagitis/gastric polyps\par -hyperplastic rectal polyp\par Completed hand surgery, PT\par Saw [facility] for rheum eval\par Labs with hgb 11.1, normal renal fucntion, otherwise unrevealing.  \par \par Saw cardiology, had stress testing etc now on losartan/HCTZ 50/12.5 x 2 months.  \par \par c/o pain\par left axilla, left shoulder, left arm, worse when she lies on that side, worse if she abducts x over a month.  Tried hot  and cold compresses and tylenol without improvement, changes in intensitry, no injury, no rash. touching it hurts as well.  \par \par Legs feel weak sometimes, sanjuana horses in her feet at night, wakes her up, no fall, Walking can make it worse.  also has shooting pain toes up the leg, varying sides, also about a month. Walks an hour every day.

## 2022-08-29 NOTE — PHYSICAL EXAM
[No Acute Distress] : no acute distress [Normal Sclera/Conjunctiva] : normal sclera/conjunctiva [No Respiratory Distress] : no respiratory distress  [Normal Axillary Nodes] : no axillary lymphadenopathy [de-identified] : left shoulder: tednerenss over posterior shoulder and at ac joint, increased pain with abduction past 90 degrees.  normal muscle strength.  no rash in chest, underarm or back.  no axillary masses orswelling.   Lower ext:  normal strngth at hips, knees, ankles, no rash, reflexes 2+ at knees b/l, 1+ at ankles b/l.  no large meuscle group tenderness

## 2022-08-29 NOTE — ASSESSMENT
[FreeTextEntry1] : 67 for f/u\par \par Left shoulder pain: focused mainly in the shoulder joint, bursiits vs tendonitis, no injury to area.  given no change in  one month,. advised ortho eval to avoid frozen shoulder\par \par B/l leg cramps and foot pain:\par normal exam\par will check labs for electrolyte disturbance (n ARB,thiazide) and iron levels (did have a new slight anemia on [place] labs)\par advised stretching at night and staying hydrated. \par \par \par

## 2022-08-30 ENCOUNTER — NON-APPOINTMENT (OUTPATIENT)
Age: 67
End: 2022-08-30

## 2022-08-30 LAB
ANION GAP SERPL CALC-SCNC: 12 MMOL/L
BASOPHILS # BLD AUTO: 0.04 K/UL
BASOPHILS NFR BLD AUTO: 0.6 %
BUN SERPL-MCNC: 19 MG/DL
CALCIUM SERPL-MCNC: 10 MG/DL
CHLORIDE SERPL-SCNC: 102 MMOL/L
CO2 SERPL-SCNC: 27 MMOL/L
CREAT SERPL-MCNC: 1.06 MG/DL
EGFR: 58 ML/MIN/1.73M2
EOSINOPHIL # BLD AUTO: 0.27 K/UL
EOSINOPHIL NFR BLD AUTO: 3.9 %
FERRITIN SERPL-MCNC: 50 NG/ML
GLUCOSE SERPL-MCNC: 86 MG/DL
HCT VFR BLD CALC: 37.2 %
HGB BLD-MCNC: 12.3 G/DL
IMM GRANULOCYTES NFR BLD AUTO: 0.1 %
IRON SATN MFR SERPL: 23 %
IRON SERPL-MCNC: 71 UG/DL
LYMPHOCYTES # BLD AUTO: 2.95 K/UL
LYMPHOCYTES NFR BLD AUTO: 42.8 %
MAN DIFF?: NORMAL
MCHC RBC-ENTMCNC: 31 PG
MCHC RBC-ENTMCNC: 33.1 GM/DL
MCV RBC AUTO: 93.7 FL
MONOCYTES # BLD AUTO: 0.48 K/UL
MONOCYTES NFR BLD AUTO: 7 %
NEUTROPHILS # BLD AUTO: 3.15 K/UL
NEUTROPHILS NFR BLD AUTO: 45.6 %
PLATELET # BLD AUTO: 250 K/UL
POTASSIUM SERPL-SCNC: 4.6 MMOL/L
RBC # BLD: 3.97 M/UL
RBC # FLD: 12.9 %
SODIUM SERPL-SCNC: 141 MMOL/L
TIBC SERPL-MCNC: 315 UG/DL
TSH SERPL-ACNC: 2.69 UIU/ML
UIBC SERPL-MCNC: 244 UG/DL
WBC # FLD AUTO: 6.9 K/UL

## 2022-09-07 ENCOUNTER — APPOINTMENT (OUTPATIENT)
Dept: ORTHOPEDIC SURGERY | Facility: CLINIC | Age: 67
End: 2022-09-07

## 2022-09-07 VITALS
SYSTOLIC BLOOD PRESSURE: 125 MMHG | HEART RATE: 73 BPM | BODY MASS INDEX: 30.04 KG/M2 | WEIGHT: 153 LBS | OXYGEN SATURATION: 98 % | HEIGHT: 60 IN | DIASTOLIC BLOOD PRESSURE: 75 MMHG

## 2022-09-07 PROCEDURE — 99214 OFFICE O/P EST MOD 30 MIN: CPT

## 2022-09-07 PROCEDURE — 73030 X-RAY EXAM OF SHOULDER: CPT | Mod: LT

## 2022-10-19 ENCOUNTER — APPOINTMENT (OUTPATIENT)
Dept: ORTHOPEDIC SURGERY | Facility: CLINIC | Age: 67
End: 2022-10-19

## 2022-10-19 VITALS
HEIGHT: 60 IN | WEIGHT: 153 LBS | DIASTOLIC BLOOD PRESSURE: 75 MMHG | SYSTOLIC BLOOD PRESSURE: 108 MMHG | BODY MASS INDEX: 30.04 KG/M2 | OXYGEN SATURATION: 98 % | HEART RATE: 75 BPM

## 2022-10-19 PROCEDURE — 99214 OFFICE O/P EST MOD 30 MIN: CPT

## 2022-11-01 ENCOUNTER — OUTPATIENT (OUTPATIENT)
Dept: OUTPATIENT SERVICES | Facility: HOSPITAL | Age: 67
LOS: 1 days | End: 2022-11-01
Payer: COMMERCIAL

## 2022-11-01 ENCOUNTER — APPOINTMENT (OUTPATIENT)
Dept: MRI IMAGING | Facility: CLINIC | Age: 67
End: 2022-11-01

## 2022-11-01 DIAGNOSIS — M65.332 TRIGGER FINGER, LEFT MIDDLE FINGER: Chronic | ICD-10-CM

## 2022-11-01 DIAGNOSIS — Z00.8 ENCOUNTER FOR OTHER GENERAL EXAMINATION: ICD-10-CM

## 2022-11-01 DIAGNOSIS — Z98.890 OTHER SPECIFIED POSTPROCEDURAL STATES: Chronic | ICD-10-CM

## 2022-11-01 DIAGNOSIS — Z98.51 TUBAL LIGATION STATUS: Chronic | ICD-10-CM

## 2022-11-01 PROCEDURE — 73221 MRI JOINT UPR EXTREM W/O DYE: CPT

## 2022-11-01 PROCEDURE — 73221 MRI JOINT UPR EXTREM W/O DYE: CPT | Mod: 26,LT

## 2022-11-09 ENCOUNTER — APPOINTMENT (OUTPATIENT)
Dept: ORTHOPEDIC SURGERY | Facility: CLINIC | Age: 67
End: 2022-11-09

## 2022-11-09 VITALS
HEART RATE: 74 BPM | OXYGEN SATURATION: 97 % | SYSTOLIC BLOOD PRESSURE: 112 MMHG | HEIGHT: 60 IN | WEIGHT: 153 LBS | DIASTOLIC BLOOD PRESSURE: 66 MMHG | BODY MASS INDEX: 30.04 KG/M2

## 2022-11-09 DIAGNOSIS — M75.00 ADHESIVE CAPSULITIS OF UNSPECIFIED SHOULDER: ICD-10-CM

## 2022-11-09 PROCEDURE — 99214 OFFICE O/P EST MOD 30 MIN: CPT

## 2022-12-12 ENCOUNTER — RX RENEWAL (OUTPATIENT)
Age: 67
End: 2022-12-12

## 2023-01-11 ENCOUNTER — NON-APPOINTMENT (OUTPATIENT)
Age: 68
End: 2023-01-11

## 2023-01-12 ENCOUNTER — APPOINTMENT (OUTPATIENT)
Dept: INTERNAL MEDICINE | Facility: CLINIC | Age: 68
End: 2023-01-12
Payer: MEDICARE

## 2023-01-12 VITALS
BODY MASS INDEX: 29.45 KG/M2 | SYSTOLIC BLOOD PRESSURE: 129 MMHG | WEIGHT: 150 LBS | HEIGHT: 60 IN | TEMPERATURE: 98.3 F | DIASTOLIC BLOOD PRESSURE: 81 MMHG | HEART RATE: 72 BPM | OXYGEN SATURATION: 98 %

## 2023-01-12 PROCEDURE — 99213 OFFICE O/P EST LOW 20 MIN: CPT

## 2023-01-12 NOTE — HISTORY OF PRESENT ILLNESS
[FreeTextEntry8] : here for complaint of blood in the stool . 2 days ago note some blood on the toilet paper and some mixed in her stool . asymptomatic, and the next day some blood in her stool  and today with BM no blood seen. She has no symptoms at all, no pain , no lightheadedness, o abdominal cramping. Never noted in the past. Did not eat anything red

## 2023-01-12 NOTE — ASSESSMENT
[FreeTextEntry1] : ? rectal bleed, may have been hemorrhoidal, small tear, will check cbc and has an appointment on 2.2.23 with Dr Agrawal for routine follow up.

## 2023-01-12 NOTE — PHYSICAL EXAM
[Normal] : no respiratory distress, lungs were clear to auscultation bilaterally and no accessory muscle use [Normal Sphincter Tone] : normal sphincter tone [Stool Occult Blood] : stool negative for occult blood [FreeTextEntry1] : no ext hermorrhoids, normal tone , brown stool guaiac negative

## 2023-01-13 LAB
BASOPHILS # BLD AUTO: 0.04 K/UL
BASOPHILS NFR BLD AUTO: 0.6 %
EOSINOPHIL # BLD AUTO: 0.3 K/UL
EOSINOPHIL NFR BLD AUTO: 4.7 %
HCT VFR BLD CALC: 36.2 %
HGB BLD-MCNC: 11.8 G/DL
IMM GRANULOCYTES NFR BLD AUTO: 0.3 %
LYMPHOCYTES # BLD AUTO: 2.96 K/UL
LYMPHOCYTES NFR BLD AUTO: 46.5 %
MAN DIFF?: NORMAL
MCHC RBC-ENTMCNC: 31 PG
MCHC RBC-ENTMCNC: 32.6 GM/DL
MCV RBC AUTO: 95 FL
MONOCYTES # BLD AUTO: 0.47 K/UL
MONOCYTES NFR BLD AUTO: 7.4 %
NEUTROPHILS # BLD AUTO: 2.58 K/UL
NEUTROPHILS NFR BLD AUTO: 40.5 %
PLATELET # BLD AUTO: 231 K/UL
RBC # BLD: 3.81 M/UL
RBC # FLD: 13.2 %
WBC # FLD AUTO: 6.37 K/UL

## 2023-01-13 NOTE — BRIEF OPERATIVE NOTE - NSICDXBRIEFOPLAUNCH_GEN_ALL_CORE
How Severe Is Your Rash?: mild Is This A New Presentation, Or A Follow-Up?: Rash <--- Click to Launch ICDx for PreOp, PostOp and Procedure

## 2023-01-17 ENCOUNTER — TRANSCRIPTION ENCOUNTER (OUTPATIENT)
Age: 68
End: 2023-01-17

## 2023-01-17 ENCOUNTER — APPOINTMENT (OUTPATIENT)
Dept: ORTHOPEDIC SURGERY | Facility: CLINIC | Age: 68
End: 2023-01-17

## 2023-02-07 ENCOUNTER — RX RENEWAL (OUTPATIENT)
Age: 68
End: 2023-02-07

## 2023-02-15 ENCOUNTER — APPOINTMENT (OUTPATIENT)
Dept: INTERNAL MEDICINE | Facility: CLINIC | Age: 68
End: 2023-02-15
Payer: MEDICARE

## 2023-02-15 VITALS
WEIGHT: 150 LBS | DIASTOLIC BLOOD PRESSURE: 73 MMHG | HEART RATE: 77 BPM | SYSTOLIC BLOOD PRESSURE: 110 MMHG | TEMPERATURE: 98.6 F | BODY MASS INDEX: 29.45 KG/M2 | HEIGHT: 60 IN | OXYGEN SATURATION: 98 %

## 2023-02-15 DIAGNOSIS — I10 ESSENTIAL (PRIMARY) HYPERTENSION: ICD-10-CM

## 2023-02-15 DIAGNOSIS — Z00.00 ENCOUNTER FOR GENERAL ADULT MEDICAL EXAMINATION W/OUT ABNORMAL FINDINGS: ICD-10-CM

## 2023-02-15 DIAGNOSIS — E78.00 PURE HYPERCHOLESTEROLEMIA, UNSPECIFIED: ICD-10-CM

## 2023-02-15 DIAGNOSIS — D64.9 ANEMIA, UNSPECIFIED: ICD-10-CM

## 2023-02-15 PROCEDURE — 36415 COLL VENOUS BLD VENIPUNCTURE: CPT

## 2023-02-15 PROCEDURE — G0439: CPT

## 2023-02-15 NOTE — ASSESSMENT
[FreeTextEntry1] : 68 y/o female for AWV\par \par Recent Blood in stool last month, no further episodes. Had CLS in July 22.  CBC stable then, repeat today.\par \par HLD: on atorva 20, check labs today\par \par HTN: controlled on losartan/hctz\par \par GERD: H2 blocker - 40 famotidine + PPI\par Was advised to stop ASA - is off of it now.   \par s/p EGD July 22\par \par Saw rheum for arthralgias, darius OA and DJD\par Saw Dr Chauhan for b/l shoulder pain, PT helped\par ALeve sometimes\par \par ACP: daughter is HCP\par \par HCM:\par COVID done\par FLu shot done\par Breast Cancer Feb 22, repeat ordered\par Colon Cancer CLS July 22\par DEXA Jan 2021 -1.6, repeat ordered\par HCV Testing 09 Oct 2018 HCV Interpretation: Nonreactive \par Pneumo, tetanus, shingrix complete\par \par

## 2023-02-15 NOTE — HEALTH RISK ASSESSMENT
[No falls in past year] : Patient reported no falls in the past year [None] : None [Fully functional (bathing, dressing, toileting, transferring, walking, feeding)] : Fully functional (bathing, dressing, toileting, transferring, walking, feeding) [Fully functional (using the telephone, shopping, preparing meals, housekeeping, doing laundry, using] : Fully functional and needs no help or supervision to perform IADLs (using the telephone, shopping, preparing meals, housekeeping, doing laundry, using transportation, managing medications and managing finances) [Reviewed no changes] : Reviewed, no changes [Designated Healthcare Proxy] : Designated healthcare proxy [Name: ___] : Health Care Proxy's Name: [unfilled]  [Relationship: ___] : Relationship: [unfilled] [Never] : Never [No] : No [0] : 2) Feeling down, depressed, or hopeless: Not at all (0) [PHQ-2 Negative - No further assessment needed] : PHQ-2 Negative - No further assessment needed [Audit-CScore] : 0 [[id number]] : 0 [Change in mental status noted] : No change in mental status noted [With Significant Other] : lives with significant other [] :  [Reports changes in hearing] : Reports changes in hearing [Reports changes in vision] : Reports no changes in vision [Reports normal functional visual acuity (ie: able to read med bottle)] : Reports normal functional visual acuity [AdvancecareDate] : 02/15/2023

## 2023-02-15 NOTE — HISTORY OF PRESENT ILLNESS
[de-identified] : \par \par Occ has left leg pain, lasts a few seconds resolved with changes in position\par \par Sometimes coughs after drinking liquids,

## 2023-02-17 ENCOUNTER — TRANSCRIPTION ENCOUNTER (OUTPATIENT)
Age: 68
End: 2023-02-17

## 2023-02-17 LAB
ALBUMIN SERPL ELPH-MCNC: 4.7 G/DL
ALP BLD-CCNC: 91 U/L
ALT SERPL-CCNC: 24 U/L
ANION GAP SERPL CALC-SCNC: 13 MMOL/L
AST SERPL-CCNC: 23 U/L
BASOPHILS # BLD AUTO: 0.04 K/UL
BASOPHILS NFR BLD AUTO: 0.6 %
BILIRUB SERPL-MCNC: 0.4 MG/DL
BUN SERPL-MCNC: 23 MG/DL
CALCIUM SERPL-MCNC: 10 MG/DL
CHLORIDE SERPL-SCNC: 102 MMOL/L
CHOLEST SERPL-MCNC: 181 MG/DL
CO2 SERPL-SCNC: 25 MMOL/L
CREAT SERPL-MCNC: 1.01 MG/DL
EGFR: 61 ML/MIN/1.73M2
EOSINOPHIL # BLD AUTO: 0.31 K/UL
EOSINOPHIL NFR BLD AUTO: 4.6 %
FERRITIN SERPL-MCNC: 57 NG/ML
GLUCOSE SERPL-MCNC: 63 MG/DL
HCT VFR BLD CALC: 35.3 %
HDLC SERPL-MCNC: 56 MG/DL
HGB BLD-MCNC: 11.9 G/DL
IMM GRANULOCYTES NFR BLD AUTO: 0.1 %
IRON SATN MFR SERPL: 26 %
IRON SERPL-MCNC: 88 UG/DL
LDLC SERPL CALC-MCNC: 99 MG/DL
LYMPHOCYTES # BLD AUTO: 2.98 K/UL
LYMPHOCYTES NFR BLD AUTO: 44.6 %
MAN DIFF?: NORMAL
MCHC RBC-ENTMCNC: 30.9 PG
MCHC RBC-ENTMCNC: 33.7 GM/DL
MCV RBC AUTO: 91.7 FL
MONOCYTES # BLD AUTO: 0.54 K/UL
MONOCYTES NFR BLD AUTO: 8.1 %
NEUTROPHILS # BLD AUTO: 2.8 K/UL
NEUTROPHILS NFR BLD AUTO: 42 %
NONHDLC SERPL-MCNC: 124 MG/DL
PLATELET # BLD AUTO: 239 K/UL
POTASSIUM SERPL-SCNC: 3.8 MMOL/L
PROT SERPL-MCNC: 7.5 G/DL
RBC # BLD: 3.85 M/UL
RBC # FLD: 13 %
SODIUM SERPL-SCNC: 140 MMOL/L
TIBC SERPL-MCNC: 332 UG/DL
TRIGL SERPL-MCNC: 128 MG/DL
TSH SERPL-ACNC: 3.08 UIU/ML
UIBC SERPL-MCNC: 244 UG/DL
WBC # FLD AUTO: 6.68 K/UL

## 2023-03-23 ENCOUNTER — APPOINTMENT (OUTPATIENT)
Dept: ULTRASOUND IMAGING | Facility: CLINIC | Age: 68
End: 2023-03-23
Payer: MEDICARE

## 2023-03-23 ENCOUNTER — APPOINTMENT (OUTPATIENT)
Dept: MAMMOGRAPHY | Facility: CLINIC | Age: 68
End: 2023-03-23
Payer: MEDICARE

## 2023-03-23 ENCOUNTER — RESULT REVIEW (OUTPATIENT)
Age: 68
End: 2023-03-23

## 2023-03-23 ENCOUNTER — APPOINTMENT (OUTPATIENT)
Dept: RADIOLOGY | Facility: CLINIC | Age: 68
End: 2023-03-23
Payer: MEDICARE

## 2023-03-23 ENCOUNTER — OUTPATIENT (OUTPATIENT)
Dept: OUTPATIENT SERVICES | Facility: HOSPITAL | Age: 68
LOS: 1 days | End: 2023-03-23
Payer: MEDICARE

## 2023-03-23 DIAGNOSIS — Z98.890 OTHER SPECIFIED POSTPROCEDURAL STATES: Chronic | ICD-10-CM

## 2023-03-23 DIAGNOSIS — Z98.51 TUBAL LIGATION STATUS: Chronic | ICD-10-CM

## 2023-03-23 DIAGNOSIS — M65.332 TRIGGER FINGER, LEFT MIDDLE FINGER: Chronic | ICD-10-CM

## 2023-03-23 DIAGNOSIS — Z00.8 ENCOUNTER FOR OTHER GENERAL EXAMINATION: ICD-10-CM

## 2023-03-23 PROCEDURE — 77067 SCR MAMMO BI INCL CAD: CPT

## 2023-03-23 PROCEDURE — 77067 SCR MAMMO BI INCL CAD: CPT | Mod: 26

## 2023-03-23 PROCEDURE — 76641 ULTRASOUND BREAST COMPLETE: CPT | Mod: 26,50

## 2023-03-23 PROCEDURE — 77063 BREAST TOMOSYNTHESIS BI: CPT | Mod: 26

## 2023-03-23 PROCEDURE — 76641 ULTRASOUND BREAST COMPLETE: CPT

## 2023-03-23 PROCEDURE — 77063 BREAST TOMOSYNTHESIS BI: CPT

## 2023-03-23 PROCEDURE — 77085 DXA BONE DENSITY AXL VRT FX: CPT | Mod: 26

## 2023-03-23 PROCEDURE — 77085 DXA BONE DENSITY AXL VRT FX: CPT

## 2023-03-24 ENCOUNTER — TRANSCRIPTION ENCOUNTER (OUTPATIENT)
Age: 68
End: 2023-03-24

## 2023-05-10 ENCOUNTER — NON-APPOINTMENT (OUTPATIENT)
Age: 68
End: 2023-05-10

## 2023-06-26 ENCOUNTER — NON-APPOINTMENT (OUTPATIENT)
Age: 68
End: 2023-06-26

## 2023-07-12 ENCOUNTER — APPOINTMENT (OUTPATIENT)
Dept: INTERNAL MEDICINE | Facility: CLINIC | Age: 68
End: 2023-07-12

## 2023-07-17 ENCOUNTER — APPOINTMENT (OUTPATIENT)
Dept: INTERNAL MEDICINE | Facility: CLINIC | Age: 68
End: 2023-07-17

## 2023-08-17 ENCOUNTER — APPOINTMENT (OUTPATIENT)
Dept: INTERNAL MEDICINE | Facility: CLINIC | Age: 68
End: 2023-08-17
Payer: MEDICARE

## 2023-08-17 ENCOUNTER — OUTPATIENT (OUTPATIENT)
Dept: OUTPATIENT SERVICES | Facility: HOSPITAL | Age: 68
LOS: 1 days | End: 2023-08-17

## 2023-08-17 VITALS
DIASTOLIC BLOOD PRESSURE: 80 MMHG | BODY MASS INDEX: 28.86 KG/M2 | HEART RATE: 74 BPM | HEIGHT: 60 IN | WEIGHT: 147 LBS | SYSTOLIC BLOOD PRESSURE: 114 MMHG | OXYGEN SATURATION: 93 %

## 2023-08-17 DIAGNOSIS — R25.2 CRAMP AND SPASM: ICD-10-CM

## 2023-08-17 DIAGNOSIS — I10 ESSENTIAL (PRIMARY) HYPERTENSION: ICD-10-CM

## 2023-08-17 DIAGNOSIS — Z01.818 ENCOUNTER FOR OTHER PREPROCEDURAL EXAMINATION: ICD-10-CM

## 2023-08-17 DIAGNOSIS — M65.331 TRIGGER FINGER, RIGHT MIDDLE FINGER: ICD-10-CM

## 2023-08-17 DIAGNOSIS — Z98.890 OTHER SPECIFIED POSTPROCEDURAL STATES: Chronic | ICD-10-CM

## 2023-08-17 DIAGNOSIS — Z87.39 PERSONAL HISTORY OF OTHER DISEASES OF THE MUSCULOSKELETAL SYSTEM AND CONNECTIVE TISSUE: ICD-10-CM

## 2023-08-17 DIAGNOSIS — Z98.51 TUBAL LIGATION STATUS: Chronic | ICD-10-CM

## 2023-08-17 DIAGNOSIS — M53.86 OTHER SPECIFIED DORSOPATHIES, LUMBAR REGION: ICD-10-CM

## 2023-08-17 DIAGNOSIS — M65.30 TRIGGER FINGER, UNSPECIFIED FINGER: ICD-10-CM

## 2023-08-17 DIAGNOSIS — M25.512 PAIN IN LEFT SHOULDER: ICD-10-CM

## 2023-08-17 DIAGNOSIS — Z87.19 PERSONAL HISTORY OF OTHER DISEASES OF THE DIGESTIVE SYSTEM: ICD-10-CM

## 2023-08-17 DIAGNOSIS — M65.332 TRIGGER FINGER, LEFT MIDDLE FINGER: Chronic | ICD-10-CM

## 2023-08-17 PROCEDURE — 99213 OFFICE O/P EST LOW 20 MIN: CPT

## 2023-08-17 NOTE — PHYSICAL EXAM
[No Acute Distress] : no acute distress [Well Developed] : well developed [Normal Sclera/Conjunctiva] : normal sclera/conjunctiva [No Respiratory Distress] : no respiratory distress  [No Accessory Muscle Use] : no accessory muscle use [Clear to Auscultation] : lungs were clear to auscultation bilaterally [Normal Rate] : normal rate  [Regular Rhythm] : with a regular rhythm [Normal S1, S2] : normal S1 and S2 [No Murmur] : no murmur heard [No Spinal Tenderness] : no spinal tenderness [Speech Grossly Normal] : speech grossly normal [Memory Grossly Normal] : memory grossly normal [de-identified] : Straight leg raise negative bilaterally, no sx produced with internal/external hip rotaiton [de-identified] : Answering questions appropriately. Gets on exam table and ambulates without assistance

## 2023-08-17 NOTE — ASSESSMENT
[FreeTextEntry1] :  HCM: - Colonoscopy 2022, next 2027 - DEXA 2023, next due 2025  RTC 6 months for CPE

## 2023-08-17 NOTE — HISTORY OF PRESENT ILLNESS
[de-identified] : Chata Vásquez is a 67yo F with PMhx of HTN, osteopenia who presents to transfer care from another provider  Requesting refills of antireflux medications  Sciatica of the left: Worst in AM but improved throughout the day. Walking improves it. No injury she can recall. Chronic issue, present for years but worse over this past week. Has done PT in the past but not recently. No groin/genital numbness, no fecal/urinary incontinence

## 2023-09-07 ENCOUNTER — APPOINTMENT (OUTPATIENT)
Dept: ORTHOPEDIC SURGERY | Facility: CLINIC | Age: 68
End: 2023-09-07
Payer: MEDICARE

## 2023-09-07 VITALS
WEIGHT: 143 LBS | HEIGHT: 60 IN | OXYGEN SATURATION: 97 % | SYSTOLIC BLOOD PRESSURE: 112 MMHG | HEART RATE: 73 BPM | DIASTOLIC BLOOD PRESSURE: 77 MMHG | BODY MASS INDEX: 28.07 KG/M2

## 2023-09-07 PROCEDURE — 99213 OFFICE O/P EST LOW 20 MIN: CPT

## 2023-09-07 PROCEDURE — 72120 X-RAY BEND ONLY L-S SPINE: CPT

## 2023-09-07 NOTE — PHYSICAL EXAM
[de-identified] : She is comfortable sitting today.  Reflexes are 1+ and symmetrical.  Motor power is normal to manual testing in all lower extremity groups and sensation is normal to light touch in all dermatomes.  Straight leg raising is negative to 90 degrees in the sitting position bilaterally. [de-identified] : In light of the fact that she had a prior laminectomy and discectomy I obtained AP and flexion-extension lateral x-rays of the lumbar spine.  There is no evidence of any spinal instability or destructive change.

## 2023-09-07 NOTE — DISCUSSION/SUMMARY
[Medication Risks Reviewed] : Medication risks reviewed [de-identified] : She will rest and restrict her activities.  She will use moist heat and I have started her on diclofenac 75 mg twice a day as a nonsteroidal anti-inflammatory.  She will call if there are problems with the medication or worsening of her symptoms and I will see her for follow-up in 3 weeks.

## 2023-09-07 NOTE — HISTORY OF PRESENT ILLNESS
[Pain Location] : pain [Worsening] : worsening [8] : a maximum pain level of 8/10 [de-identified] : I operated on her back in July 2018 for a herniated lumbar disc at L4-5 after which she did well.  In 2020 she had a recurrence of the left-sided lumbar radicular symptoms that were unresponsive to nonsurgical treatment leading to an MRI that revealed some foraminal stenosis with the symptoms eventually resolving to epidural steroid injections I sent her for.  3 weeks ago she had a recurrence of pain in the left buttock and leg extending to the ankle.  It is worse at night.  She gets occasional paresthesias but denies numbness.  There is no back or right leg pain.  She has been taking ibuprofen 600 mg 3 times a day with minimal benefit.  She is on pantoprazole and Pepcid which controls her reflux symptoms.  She is also on medication for blood pressure and a statin.

## 2023-10-03 ENCOUNTER — APPOINTMENT (OUTPATIENT)
Dept: ORTHOPEDIC SURGERY | Facility: CLINIC | Age: 68
End: 2023-10-03
Payer: MEDICARE

## 2023-10-03 VITALS — HEIGHT: 61 IN | BODY MASS INDEX: 27 KG/M2 | WEIGHT: 143 LBS

## 2023-10-03 PROCEDURE — 99213 OFFICE O/P EST LOW 20 MIN: CPT

## 2023-10-19 ENCOUNTER — NON-APPOINTMENT (OUTPATIENT)
Age: 68
End: 2023-10-19

## 2023-10-24 ENCOUNTER — APPOINTMENT (OUTPATIENT)
Dept: ORTHOPEDIC SURGERY | Facility: CLINIC | Age: 68
End: 2023-10-24

## 2023-10-31 ENCOUNTER — APPOINTMENT (OUTPATIENT)
Dept: ORTHOPEDIC SURGERY | Facility: CLINIC | Age: 68
End: 2023-10-31
Payer: MEDICARE

## 2023-10-31 VITALS — BODY MASS INDEX: 27 KG/M2 | WEIGHT: 143 LBS | HEIGHT: 61 IN

## 2023-10-31 DIAGNOSIS — Q76.49 OTHER CONGENITAL MALFORMATIONS OF SPINE, NOT ASSOCIATED WITH SCOLIOSIS: ICD-10-CM

## 2023-10-31 PROCEDURE — 99215 OFFICE O/P EST HI 40 MIN: CPT

## 2023-10-31 PROCEDURE — 72100 X-RAY EXAM L-S SPINE 2/3 VWS: CPT

## 2023-11-15 ENCOUNTER — OUTPATIENT (OUTPATIENT)
Dept: OUTPATIENT SERVICES | Facility: HOSPITAL | Age: 68
LOS: 1 days | End: 2023-11-15
Payer: MEDICARE

## 2023-11-15 ENCOUNTER — APPOINTMENT (OUTPATIENT)
Dept: MRI IMAGING | Facility: CLINIC | Age: 68
End: 2023-11-15
Payer: MEDICARE

## 2023-11-15 DIAGNOSIS — Q76.49 OTHER CONGENITAL MALFORMATIONS OF SPINE, NOT ASSOCIATED WITH SCOLIOSIS: ICD-10-CM

## 2023-11-15 DIAGNOSIS — Z98.890 OTHER SPECIFIED POSTPROCEDURAL STATES: Chronic | ICD-10-CM

## 2023-11-15 DIAGNOSIS — Z98.51 TUBAL LIGATION STATUS: Chronic | ICD-10-CM

## 2023-11-15 DIAGNOSIS — M65.332 TRIGGER FINGER, LEFT MIDDLE FINGER: Chronic | ICD-10-CM

## 2023-11-15 PROCEDURE — A9585: CPT

## 2023-11-15 PROCEDURE — 72158 MRI LUMBAR SPINE W/O & W/DYE: CPT | Mod: 26

## 2023-11-15 PROCEDURE — 72158 MRI LUMBAR SPINE W/O & W/DYE: CPT

## 2023-11-27 ENCOUNTER — APPOINTMENT (OUTPATIENT)
Dept: ORTHOPEDIC SURGERY | Facility: CLINIC | Age: 68
End: 2023-11-27
Payer: MEDICARE

## 2023-11-27 DIAGNOSIS — M51.26 OTHER INTERVERTEBRAL DISC DISPLACEMENT, LUMBAR REGION: ICD-10-CM

## 2023-11-27 PROCEDURE — 99215 OFFICE O/P EST HI 40 MIN: CPT | Mod: 95

## 2023-11-29 ENCOUNTER — APPOINTMENT (OUTPATIENT)
Dept: PAIN MANAGEMENT | Facility: CLINIC | Age: 68
End: 2023-11-29
Payer: MEDICARE

## 2023-11-29 VITALS — WEIGHT: 148 LBS | HEIGHT: 60 IN | BODY MASS INDEX: 29.06 KG/M2

## 2023-11-29 PROCEDURE — 99203 OFFICE O/P NEW LOW 30 MIN: CPT

## 2023-11-30 ENCOUNTER — OUTPATIENT (OUTPATIENT)
Dept: OUTPATIENT SERVICES | Facility: HOSPITAL | Age: 68
LOS: 1 days | End: 2023-11-30
Payer: MEDICARE

## 2023-11-30 ENCOUNTER — APPOINTMENT (OUTPATIENT)
Dept: ORTHOPEDIC SURGERY | Facility: HOSPITAL | Age: 68
End: 2023-11-30
Payer: MEDICARE

## 2023-11-30 ENCOUNTER — TRANSCRIPTION ENCOUNTER (OUTPATIENT)
Age: 68
End: 2023-11-30

## 2023-11-30 VITALS
HEART RATE: 86 BPM | OXYGEN SATURATION: 100 % | SYSTOLIC BLOOD PRESSURE: 116 MMHG | RESPIRATION RATE: 20 BRPM | DIASTOLIC BLOOD PRESSURE: 73 MMHG

## 2023-11-30 VITALS
OXYGEN SATURATION: 98 % | RESPIRATION RATE: 14 BRPM | SYSTOLIC BLOOD PRESSURE: 128 MMHG | HEIGHT: 60 IN | DIASTOLIC BLOOD PRESSURE: 83 MMHG | TEMPERATURE: 98 F | WEIGHT: 147.93 LBS | HEART RATE: 73 BPM

## 2023-11-30 DIAGNOSIS — Z98.51 TUBAL LIGATION STATUS: Chronic | ICD-10-CM

## 2023-11-30 DIAGNOSIS — Z98.890 OTHER SPECIFIED POSTPROCEDURAL STATES: Chronic | ICD-10-CM

## 2023-11-30 DIAGNOSIS — M54.16 RADICULOPATHY, LUMBAR REGION: ICD-10-CM

## 2023-11-30 PROCEDURE — 62323 NJX INTERLAMINAR LMBR/SAC: CPT

## 2023-11-30 NOTE — ASU DISCHARGE PLAN (ADULT/PEDIATRIC) - NS MD DC FALL RISK RISK
For information on Fall & Injury Prevention, visit: [email or web address] OR  [email or web address] OR  https://www.cdc.gov/steadi/patient.html

## 2023-11-30 NOTE — ASU PREOP CHECKLIST - HEIGHT IN INCHES
1. Cont. . methotrexate - 8 tablets a week and folic acid 1mg a day   2. Cont. xlejanz 11mg a day    3. Return to clinic in  6 months -   4. .Check labs in 4  months - in January . 5. Diclofenac gel 1 % for left knee. 6. Cont. diclofneac 50mg once a day   7. Tylenol ES two tablets twice ad ay   8. Physical therapy left knee   9. Rest left knee x 3 days   10.  Check left knee xray
0

## 2024-01-04 ENCOUNTER — APPOINTMENT (OUTPATIENT)
Dept: ORTHOPEDIC SURGERY | Facility: CLINIC | Age: 69
End: 2024-01-04
Payer: MEDICARE

## 2024-01-04 VITALS — WEIGHT: 148 LBS | BODY MASS INDEX: 29.06 KG/M2 | HEIGHT: 60 IN

## 2024-01-04 PROCEDURE — 99214 OFFICE O/P EST MOD 30 MIN: CPT

## 2024-01-12 ENCOUNTER — APPOINTMENT (OUTPATIENT)
Dept: PAIN MANAGEMENT | Facility: CLINIC | Age: 69
End: 2024-01-12
Payer: MEDICARE

## 2024-01-12 DIAGNOSIS — M54.16 RADICULOPATHY, LUMBAR REGION: ICD-10-CM

## 2024-01-12 PROCEDURE — 99213 OFFICE O/P EST LOW 20 MIN: CPT

## 2024-01-12 NOTE — ASSESSMENT
[FreeTextEntry1] : Interim history The patient returns with pain that has been treated adequately in the past with epidural steroid injections.  The patient's complaints are consistent with radiculopathy. Patient's ADL's increase with prior AMY.   The last injection gave greater than 60% reduction of the pain but now there is a return of symptoms. The patient is requesting a subsequent epidural steroid injection to alleviate pain and improve functional ability and quality of life.  Patient is a candidate. Objective findings Since the last visit, there have been no new imaging studies. The patient has no new symptoms except the return of the their pain complaints. Motor and sensory function is unchanged. Plan Spoke to patient about epidural steroid injections. Explained risks, benefits and alternatives including but not limited to the risk of infection, bleeding, headache, syncopal episode, failure to resolve issues, allergic reaction, symptom recurrence, allergic reaction, nerve injury, and increased pain. The patient understands the risks. All questions were answered. The patient is willing to proceed.

## 2024-01-12 NOTE — HISTORY OF PRESENT ILLNESS
[FreeTextEntry1] : THE PATIENT IS 68 year OLD RT HAND DOMINANT female WHO PRESENT TODAY IN OFFICE AFTTER A AMY ON 2023 NONA WITH LSPINE RADTING DWN TO LT FOOT THROUGH THE SIDE PAIN RETURNING OF CONTINUATION PLAN OF CARE FOR PAIN     DATE OF INJURY/ONSET 2020 AFTER LSPINE LACT. 4 MNTH AGO        PAIN: AT REST: 5/10       WTIH ACTIVITY: 7/10     MECHANISM OF INJURY: AFTER PX 2020        QUALITY OF SYMPTOMS:  DULL/ACHE, CHILLS, PIN AND NEEDLE, BURNING, THRBOBBING, SHOOTTING      IMPROVES WITH:  NA, HEAT 2HRS ON AND 2 HRS OFF      WORSE WITH:  ALL ADL OF PT, STANDING, WALKINMG, PRO LONG ACTIVITY, CAUGHING, SNEEZING      PRIOR TREATMENT:  Pt participate with PT since 08/2023 X3/week, this provides no pain Relief         PRIOR IMAGIN/15/2023 MRI SPINE

## 2024-01-21 ENCOUNTER — NON-APPOINTMENT (OUTPATIENT)
Age: 69
End: 2024-01-21

## 2024-02-16 ENCOUNTER — OUTPATIENT (OUTPATIENT)
Dept: OUTPATIENT SERVICES | Facility: HOSPITAL | Age: 69
LOS: 1 days | End: 2024-02-16
Payer: MEDICARE

## 2024-02-16 ENCOUNTER — APPOINTMENT (OUTPATIENT)
Dept: INTERNAL MEDICINE | Facility: CLINIC | Age: 69
End: 2024-02-16
Payer: MEDICARE

## 2024-02-16 VITALS
BODY MASS INDEX: 29.07 KG/M2 | WEIGHT: 154 LBS | HEIGHT: 61 IN | DIASTOLIC BLOOD PRESSURE: 70 MMHG | HEART RATE: 76 BPM | OXYGEN SATURATION: 95 % | SYSTOLIC BLOOD PRESSURE: 130 MMHG

## 2024-02-16 DIAGNOSIS — Z98.890 OTHER SPECIFIED POSTPROCEDURAL STATES: Chronic | ICD-10-CM

## 2024-02-16 DIAGNOSIS — L91.8 OTHER HYPERTROPHIC DISORDERS OF THE SKIN: ICD-10-CM

## 2024-02-16 DIAGNOSIS — M65.332 TRIGGER FINGER, LEFT MIDDLE FINGER: Chronic | ICD-10-CM

## 2024-02-16 DIAGNOSIS — I10 ESSENTIAL (PRIMARY) HYPERTENSION: ICD-10-CM

## 2024-02-16 DIAGNOSIS — M85.80 OTHER SPECIFIED DISORDERS OF BONE DENSITY AND STRUCTURE, UNSPECIFIED SITE: ICD-10-CM

## 2024-02-16 PROCEDURE — G0439: CPT

## 2024-02-16 RX ORDER — NAPROXEN SODIUM 550 MG/1
550 TABLET ORAL
Qty: 60 | Refills: 1 | Status: DISCONTINUED | COMMUNITY
Start: 2023-10-31 | End: 2024-02-16

## 2024-02-16 RX ORDER — PREDNISONE 20 MG/1
20 TABLET ORAL
Qty: 26 | Refills: 0 | Status: DISCONTINUED | COMMUNITY
Start: 2023-10-03 | End: 2024-02-16

## 2024-02-16 RX ORDER — DICLOFENAC SODIUM 75 MG/1
75 TABLET, DELAYED RELEASE ORAL
Qty: 60 | Refills: 0 | Status: DISCONTINUED | COMMUNITY
Start: 2023-09-07 | End: 2024-02-16

## 2024-02-16 RX ORDER — DICLOFENAC SODIUM 75 MG/1
75 TABLET, DELAYED RELEASE ORAL
Qty: 60 | Refills: 0 | Status: DISCONTINUED | COMMUNITY
Start: 2023-10-03 | End: 2024-02-16

## 2024-02-16 NOTE — HEALTH RISK ASSESSMENT
[No] : In the past 12 months have you used drugs other than those required for medical reasons? No [No falls in past year] : Patient reported no falls in the past year [0] : 2) Feeling down, depressed, or hopeless: Not at all (0) [PHQ-2 Negative - No further assessment needed] : PHQ-2 Negative - No further assessment needed [With Family] : lives with family [Retired] : retired [] :  [Fully functional (bathing, dressing, toileting, transferring, walking, feeding)] : Fully functional (bathing, dressing, toileting, transferring, walking, feeding) [Fully functional (using the telephone, shopping, preparing meals, housekeeping, doing laundry, using] : Fully functional and needs no help or supervision to perform IADLs (using the telephone, shopping, preparing meals, housekeeping, doing laundry, using transportation, managing medications and managing finances) [Reports changes in vision] : Reports changes in vision [With Patient/Caregiver] : , with patient/caregiver [Designated Healthcare Proxy] : Designated healthcare proxy [Name: ___] : Health Care Proxy's Name: [unfilled]  [Relationship: ___] : Relationship: [unfilled] [Never] : Never [de-identified] : Limited due to sciatica, normally likes to walk [de-identified] : Breakfast: Bowl of cereal and orange juice, lunch: Egg and potatoes, dinner: chicken, veggies, and rice. [QCN2Ttirh] : 0 [Sexually Active] : not sexually active [Reports changes in hearing] : Reports no changes in hearing [Reports changes in dental health] : Reports no changes in dental health [AdvancecareDate] : 02/24

## 2024-02-16 NOTE — HISTORY OF PRESENT ILLNESS
[de-identified] : Chata Vásquez is a 67yo F with PMhx of HTN, osteopenia who presents for CPE.  Supplements: Fish oil Vitamin D3 No concerns. Requesting Gi referral to follow-up on stomach polyps

## 2024-02-16 NOTE — ASSESSMENT
[FreeTextEntry1] :  HCM: - CBC, CMP, lipids, a1c, vitamin D - Mammogram due - Colonoscopy 2022, next 2027--GI referral - DEXA 2023, next due 2025 - Pap smear--seeing Gyn in 1 week  RTC 1 year for CPE

## 2024-02-16 NOTE — PHYSICAL EXAM
[No Acute Distress] : no acute distress [Well Developed] : well developed [Normal Sclera/Conjunctiva] : normal sclera/conjunctiva [PERRL] : pupils equal round and reactive to light [EOMI] : extraocular movements intact [Normal Oropharynx] : the oropharynx was normal [No Lymphadenopathy] : no lymphadenopathy [Supple] : supple [Thyroid Normal, No Nodules] : the thyroid was normal and there were no nodules present [No Accessory Muscle Use] : no accessory muscle use [No Respiratory Distress] : no respiratory distress  [Clear to Auscultation] : lungs were clear to auscultation bilaterally [Normal Rate] : normal rate  [Regular Rhythm] : with a regular rhythm [Normal S1, S2] : normal S1 and S2 [No Murmur] : no murmur heard [Pedal Pulses Present] : the pedal pulses are present [No Edema] : there was no peripheral edema [Soft] : abdomen soft [Non Tender] : non-tender [Non-distended] : non-distended [No Masses] : no abdominal mass palpated [No Spinal Tenderness] : no spinal tenderness [Speech Grossly Normal] : speech grossly normal [Memory Grossly Normal] : memory grossly normal [de-identified] : ?serous fluid behind left TM [de-identified] : Pendunculated skin tag over the left flank [de-identified] : Minicog 3/5. No facial asymmetry. Finger-to-nose normal, heel-to-shin normal

## 2024-02-23 DIAGNOSIS — R79.89 OTHER SPECIFIED ABNORMAL FINDINGS OF BLOOD CHEMISTRY: ICD-10-CM

## 2024-02-23 LAB
25(OH)D3 SERPL-MCNC: 44.3 NG/ML
ALBUMIN SERPL ELPH-MCNC: 4.6 G/DL
ALP BLD-CCNC: 100 U/L
ALT SERPL-CCNC: 23 U/L
ANION GAP SERPL CALC-SCNC: 13 MMOL/L
AST SERPL-CCNC: 24 U/L
BILIRUB SERPL-MCNC: 0.4 MG/DL
BUN SERPL-MCNC: 20 MG/DL
CALCIUM SERPL-MCNC: 9.6 MG/DL
CHLORIDE SERPL-SCNC: 102 MMOL/L
CHOLEST SERPL-MCNC: 177 MG/DL
CO2 SERPL-SCNC: 26 MMOL/L
CREAT SERPL-MCNC: 1.16 MG/DL
EGFR: 51 ML/MIN/1.73M2
ESTIMATED AVERAGE GLUCOSE: 128 MG/DL
GLUCOSE SERPL-MCNC: 104 MG/DL
HBA1C MFR BLD HPLC: 6.1 %
HCT VFR BLD CALC: 36.2 %
HDLC SERPL-MCNC: 55 MG/DL
HGB BLD-MCNC: 11.7 G/DL
LDLC SERPL CALC-MCNC: 101 MG/DL
MCHC RBC-ENTMCNC: 29.8 PG
MCHC RBC-ENTMCNC: 32.3 GM/DL
MCV RBC AUTO: 92.1 FL
NONHDLC SERPL-MCNC: 122 MG/DL
PLATELET # BLD AUTO: 261 K/UL
POTASSIUM SERPL-SCNC: 4.2 MMOL/L
PROT SERPL-MCNC: 7.3 G/DL
RBC # BLD: 3.93 M/UL
RBC # FLD: 12.9 %
SODIUM SERPL-SCNC: 141 MMOL/L
TRIGL SERPL-MCNC: 115 MG/DL
WBC # FLD AUTO: 5.56 K/UL

## 2024-02-28 ENCOUNTER — TRANSCRIPTION ENCOUNTER (OUTPATIENT)
Age: 69
End: 2024-02-28

## 2024-02-28 DIAGNOSIS — M85.80 OTHER SPECIFIED DISORDERS OF BONE DENSITY AND STRUCTURE, UNSPECIFIED SITE: ICD-10-CM

## 2024-02-28 DIAGNOSIS — L91.8 OTHER HYPERTROPHIC DISORDERS OF THE SKIN: ICD-10-CM

## 2024-02-28 DIAGNOSIS — Z00.00 ENCOUNTER FOR GENERAL ADULT MEDICAL EXAMINATION WITHOUT ABNORMAL FINDINGS: ICD-10-CM

## 2024-02-28 NOTE — ASU PATIENT PROFILE, ADULT - BILL OF RIGHTS/ADMISSION INFORMATION PROVIDED TO:
In Reference to previous telephone encounter from 3/25/2022: I completed a peer to peer for stress test with Dr. Damico who approved the stress test with authorization number [id number]. Our office will receive a fax confirmation later today.    Please call patient to schedule   Patient

## 2024-02-28 NOTE — ASU PATIENT PROFILE, ADULT - FALL HARM RISK - UNIVERSAL INTERVENTIONS
Bed in lowest position, wheels locked, appropriate side rails in place/Call bell, personal items and telephone in reach/Instruct patient to call for assistance before getting out of bed or chair/Non-slip footwear when patient is out of bed/[place] to call system/Physically safe environment - no spills, clutter or unnecessary equipment/Purposeful Proactive Rounding/Room/bathroom lighting operational, light cord in reach

## 2024-03-01 ENCOUNTER — APPOINTMENT (OUTPATIENT)
Dept: DERMATOLOGY | Facility: CLINIC | Age: 69
End: 2024-03-01
Payer: MEDICARE

## 2024-03-01 DIAGNOSIS — L81.4 OTHER MELANIN HYPERPIGMENTATION: ICD-10-CM

## 2024-03-01 DIAGNOSIS — D23.9 OTHER BENIGN NEOPLASM OF SKIN, UNSPECIFIED: ICD-10-CM

## 2024-03-01 PROCEDURE — 99203 OFFICE O/P NEW LOW 30 MIN: CPT

## 2024-03-04 ENCOUNTER — APPOINTMENT (OUTPATIENT)
Dept: ORTHOPEDIC SURGERY | Facility: HOSPITAL | Age: 69
End: 2024-03-04
Payer: MEDICARE

## 2024-03-04 ENCOUNTER — OUTPATIENT (OUTPATIENT)
Dept: OUTPATIENT SERVICES | Facility: HOSPITAL | Age: 69
LOS: 1 days | End: 2024-03-04
Payer: MEDICARE

## 2024-03-04 VITALS
HEIGHT: 61 IN | RESPIRATION RATE: 16 BRPM | WEIGHT: 147.93 LBS | HEART RATE: 81 BPM | OXYGEN SATURATION: 100 % | DIASTOLIC BLOOD PRESSURE: 70 MMHG | TEMPERATURE: 97 F | SYSTOLIC BLOOD PRESSURE: 120 MMHG

## 2024-03-04 VITALS
DIASTOLIC BLOOD PRESSURE: 72 MMHG | RESPIRATION RATE: 18 BRPM | HEART RATE: 80 BPM | SYSTOLIC BLOOD PRESSURE: 116 MMHG | OXYGEN SATURATION: 100 %

## 2024-03-04 DIAGNOSIS — Z98.51 TUBAL LIGATION STATUS: Chronic | ICD-10-CM

## 2024-03-04 DIAGNOSIS — M65.332 TRIGGER FINGER, LEFT MIDDLE FINGER: Chronic | ICD-10-CM

## 2024-03-04 DIAGNOSIS — Z98.890 OTHER SPECIFIED POSTPROCEDURAL STATES: Chronic | ICD-10-CM

## 2024-03-04 DIAGNOSIS — M54.16 RADICULOPATHY, LUMBAR REGION: ICD-10-CM

## 2024-03-04 PROCEDURE — 62323 NJX INTERLAMINAR LMBR/SAC: CPT

## 2024-03-04 RX ORDER — CHOLECALCIFEROL (VITAMIN D3) 125 MCG
1 CAPSULE ORAL
Refills: 0 | DISCHARGE

## 2024-03-04 RX ORDER — OMEGA-3 ACID ETHYL ESTERS 1 G
0 CAPSULE ORAL
Refills: 0 | DISCHARGE

## 2024-03-04 RX ORDER — DICLOFENAC SODIUM 75 MG/1
1 TABLET, DELAYED RELEASE ORAL
Refills: 0 | DISCHARGE

## 2024-03-04 RX ORDER — LOSARTAN/HYDROCHLOROTHIAZIDE 100MG-25MG
1 TABLET ORAL
Qty: 0 | Refills: 0 | DISCHARGE

## 2024-03-04 RX ORDER — PREGABALIN 225 MG/1
1 CAPSULE ORAL
Refills: 0 | DISCHARGE

## 2024-03-04 RX ORDER — FAMOTIDINE 10 MG/ML
1 INJECTION INTRAVENOUS
Qty: 0 | Refills: 0 | DISCHARGE

## 2024-03-04 RX ORDER — PANTOPRAZOLE SODIUM 20 MG/1
1 TABLET, DELAYED RELEASE ORAL
Qty: 0 | Refills: 0 | DISCHARGE

## 2024-03-04 RX ORDER — ATORVASTATIN CALCIUM 80 MG/1
1 TABLET, FILM COATED ORAL
Qty: 0 | Refills: 0 | DISCHARGE

## 2024-03-21 ENCOUNTER — OUTPATIENT (OUTPATIENT)
Dept: OUTPATIENT SERVICES | Facility: HOSPITAL | Age: 69
LOS: 1 days | End: 2024-03-21
Payer: MEDICARE

## 2024-03-21 ENCOUNTER — APPOINTMENT (OUTPATIENT)
Dept: ULTRASOUND IMAGING | Facility: IMAGING CENTER | Age: 69
End: 2024-03-21
Payer: MEDICARE

## 2024-03-21 DIAGNOSIS — Z98.890 OTHER SPECIFIED POSTPROCEDURAL STATES: Chronic | ICD-10-CM

## 2024-03-21 DIAGNOSIS — R79.89 OTHER SPECIFIED ABNORMAL FINDINGS OF BLOOD CHEMISTRY: ICD-10-CM

## 2024-03-21 DIAGNOSIS — R73.03 PREDIABETES: ICD-10-CM

## 2024-03-21 DIAGNOSIS — M65.332 TRIGGER FINGER, LEFT MIDDLE FINGER: Chronic | ICD-10-CM

## 2024-03-21 PROCEDURE — 93970 EXTREMITY STUDY: CPT

## 2024-03-21 PROCEDURE — 93970 EXTREMITY STUDY: CPT | Mod: 26

## 2024-03-25 ENCOUNTER — OUTPATIENT (OUTPATIENT)
Dept: OUTPATIENT SERVICES | Facility: HOSPITAL | Age: 69
LOS: 1 days | End: 2024-03-25
Payer: MEDICARE

## 2024-03-25 ENCOUNTER — RESULT REVIEW (OUTPATIENT)
Age: 69
End: 2024-03-25

## 2024-03-25 ENCOUNTER — APPOINTMENT (OUTPATIENT)
Dept: MAMMOGRAPHY | Facility: CLINIC | Age: 69
End: 2024-03-25
Payer: MEDICARE

## 2024-03-25 DIAGNOSIS — M65.332 TRIGGER FINGER, LEFT MIDDLE FINGER: Chronic | ICD-10-CM

## 2024-03-25 DIAGNOSIS — Z98.890 OTHER SPECIFIED POSTPROCEDURAL STATES: Chronic | ICD-10-CM

## 2024-03-25 DIAGNOSIS — Z98.51 TUBAL LIGATION STATUS: Chronic | ICD-10-CM

## 2024-03-25 DIAGNOSIS — Z12.31 ENCOUNTER FOR SCREENING MAMMOGRAM FOR MALIGNANT NEOPLASM OF BREAST: ICD-10-CM

## 2024-03-25 PROCEDURE — 77063 BREAST TOMOSYNTHESIS BI: CPT

## 2024-03-25 PROCEDURE — 77063 BREAST TOMOSYNTHESIS BI: CPT | Mod: 26

## 2024-03-25 PROCEDURE — 77067 SCR MAMMO BI INCL CAD: CPT

## 2024-03-25 PROCEDURE — 77067 SCR MAMMO BI INCL CAD: CPT | Mod: 26

## 2024-04-08 ENCOUNTER — APPOINTMENT (OUTPATIENT)
Dept: ORTHOPEDIC SURGERY | Facility: CLINIC | Age: 69
End: 2024-04-08
Payer: MEDICARE

## 2024-04-08 VITALS — HEIGHT: 61 IN | WEIGHT: 154 LBS | BODY MASS INDEX: 29.07 KG/M2

## 2024-04-08 DIAGNOSIS — M54.50 LOW BACK PAIN, UNSPECIFIED: ICD-10-CM

## 2024-04-08 PROCEDURE — 99214 OFFICE O/P EST MOD 30 MIN: CPT

## 2024-04-08 RX ORDER — MELOXICAM 15 MG/1
15 TABLET ORAL
Qty: 30 | Refills: 1 | Status: ACTIVE | COMMUNITY
Start: 2024-04-08 | End: 1900-01-01

## 2024-04-08 RX ORDER — DICLOFENAC SODIUM 1% 10 MG/G
1 GEL TOPICAL DAILY
Qty: 1 | Refills: 1 | Status: ACTIVE | COMMUNITY
Start: 2024-04-08 | End: 1900-01-01

## 2024-04-08 NOTE — REASON FOR VISIT
[Follow-Up Visit] : a follow-up visit for [Sciatica] : sciatica [Other: ____] : [unfilled] [Other: _____] : [unfilled]

## 2024-04-08 NOTE — HISTORY OF PRESENT ILLNESS
[Bending] : worsened by bending [Heat] : relieved by heat [NSAIDs] : relieved by nonsteroidal anti-inflammatory drugs [Rest] : relieved by rest [de-identified] : Pt s/p L4 laminectomy and partial L4-5 discectomy in 2016 with Dr Be, with acute on chronic lumbar radiculopathy. Pt presents today for f/u. She has new onset of right sided lower back/SI joint pain aggravated with general movement and bending. She has not done treatment for this. Denies RLE numbness, tingling, or weakness. Pt states symptoms are improving since last visit on 1/4/24, Pain on LLE resolved, but states she still gets some pain on left buttock. Pt denies numbness, tingling or weakness on B/L LE. Pt takes Advil 800mg PRN, Pt had LESI X2 with Dr Morejon on 11/30/23 and 01/12/24, this provided 100% pain relief. Pt stopped PT on 12/20223 due to pain, had PT since 08/2023 X3/week Pt denies fever, chills, weight changes, loss of bladder control, bowel incontinence or urinary retention or saddle anesthesia. [Walking] : not worsened by walking [Physical Therapy] : not relieved by physical therapy [Recumbency] : not relieved by recumbency [Ataxia] : no ataxia [Incontinence] : no incontinence [Loss of Dexterity] : good dexterity [Urinary Ret.] : no urinary retention [de-identified] : Recumbency, getting out of bed exacerbates pain [de-identified] : LESI provides some pain relief

## 2024-04-08 NOTE — DISCUSSION/SUMMARY
[de-identified] : 68 yo female with lumbar spasms, from Right LS region, recommend PT, NSAIDS, voltaren gel, PT, pain Consult, RTO 6 weeks.   Diagnosis, prognosis, natural history and treatment was discussed with patient. Patient was advised if the following symptoms develop: chills, fever,  loss of bladder control, bowel incontinence or urinary retention, numbness/tingling or weakness is present in upper or lower extremities, to go to the nearest emergency room. This may be a new clinical condition not present at the time of the patient visit  that may lead to paralysis and/or death, Patient advised if the above symptoms developed to also call the office immediately to inform us and to go to the nearest emergency room.

## 2024-04-08 NOTE — PHYSICAL EXAM
[] : Motor: [LE Motor Strength NL] : Motor strength of the bilateral lower extremities was normal [UE/LE] : Sensory: Intact in bilateral upper & lower extremities [0] : left ankle jerk 0 [ALL] : dorsalis pedis, posterior tibial, femoral, popliteal, and radial 2+ and symmetric bilaterally [Normal] : Oriented to person, place, and time, insight and judgement were intact and the affect was normal [Moran's Sign] : negative Moran's sign [Pronator Drift] : negative pronator drift [SLR] : negative straight leg raise [de-identified] : Lumbar ROM: limited, painful TTP b/l paraspinals R region  Skin intact L spine. No rashes, ulcers, blisters.  No lymphedema.  Incision - well healed  [de-identified] : 2 views AP and Lat of Lumbar Spine from [id number] . Read and dictated by Dr. Bubba Powers Board Certified Orthopaedic surgeon L45 laminectomy   EXAM: MR SPINE LUMBAR   PROCEDURE DATE: 11/20/2020    INTERPRETATION: LUMBOSACRAL SPINE MRI  CLINICAL INFORMATION: Low back pain. History of prior surgery with increasing pain down the left lower extremity. TECHNIQUE: Multiplanar, multisequence MR imaging was obtained of the lumbosacral spine. COMPARISON: Lumbar spine MRI 27 January 2018.  FINDINGS:  SPINAL SEGMENTATION: The patient has transitional anatomy. In keeping with prior reporting, the last intervertebral disc space is labeled L5-S1. The right transverse process of L5 forms a small pseudoarticulation with the right sacral ala. The left transverse process of L5 is broadly fused with the sacral ala. SPINAL ALIGNMENT: Lumbar lordosis is maintained. MARROW: Patient status post L4 laminectomy with partial L4-5 discectomy. Vertebral body heights are maintained. Hemangioma is seen in the T12 vertebral body. DISTAL CORD AND CONUS: Conus terminates at the level of L1. Normal morphology of the cauda equina. DISC SPACES: Partial discectomy at the L4-5 intervertebral disc. Remaining intervertebral disc heights are maintained. PARASPINAL MUSCLE AND SOFT TISSUES: Postsurgical changes in the soft tissues along the lower aspect of the lumbar spine. Mild atrophy of the posterior paraspinous musculature. INTRAABDOMINAL/INTRAPELVIC SOFT TISSUES: Within normal limits.  DISC LEVEL EVALUATION:  T12/L1: No central stenosis or neural foraminal narrowing. L1/L2: No central stenosis or neural foraminal narrowing. L2/L3: Small disc bulge. No central stenosis or neural foraminal narrowing. L3/L4: Disc bulge and infolding of ligamentum flavum. No central stenosis or neural foraminal narrowing. L4/L5: Patient status post laminectomy and partial discectomy. The previously described disc herniation is less pronounced than on prior examination. The lateral recesses are no longer stenotic. No central stenosis or neural foraminal narrowing. Mild facet arthropathy. L5/S1: No central stenosis or neural foraminal narrowing.  IMPRESSION: 1. 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 2. Patient status post L4 laminectomy and partial L4-5 discectomy. Improved appearance of the lateral recesses at the L4-5 level. 3. Mild multilevel spondylosis of lumbar spine, otherwise similar in appearance to prior MRI.  EXAM: MR SPINE LUMBAR   PROCEDURE DATE: 11/20/2020    INTERPRETATION: LUMBOSACRAL SPINE MRI  CLINICAL INFORMATION: Low back pain. History of prior surgery with increasing pain down the left lower extremity. TECHNIQUE: Multiplanar, multisequence MR imaging was obtained of the lumbosacral spine. COMPARISON: Lumbar spine MRI 27 January 2018.  FINDINGS:  SPINAL SEGMENTATION: The patient has transitional anatomy. In keeping with prior reporting, the last intervertebral disc space is labeled L5-S1. The right transverse process of L5 forms a small pseudoarticulation with the right sacral ala. The left transverse process of L5 is broadly fused with the sacral ala. SPINAL ALIGNMENT: Lumbar lordosis is maintained. MARROW: Patient status post L4 laminectomy with partial L4-5 discectomy. Vertebral body heights are maintained. Hemangioma is seen in the T12 vertebral body. DISTAL CORD AND CONUS: Conus terminates at the level of L1. Normal morphology of the cauda equina. DISC SPACES: Partial discectomy at the L4-5 intervertebral disc. Remaining intervertebral disc heights are maintained. PARASPINAL MUSCLE AND SOFT TISSUES: Postsurgical changes in the soft tissues along the lower aspect of the lumbar spine. Mild atrophy of the posterior paraspinous musculature. INTRAABDOMINAL/INTRAPELVIC SOFT TISSUES: Within normal limits.  DISC LEVEL EVALUATION:  T12/L1: No central stenosis or neural foraminal narrowing. L1/L2: No central stenosis or neural foraminal narrowing. L2/L3: Small disc bulge. No central stenosis or neural foraminal narrowing. L3/L4: Disc bulge and infolding of ligamentum flavum. No central stenosis or neural foraminal narrowing. L4/L5: Patient status post laminectomy and partial discectomy. The previously described disc herniation is less pronounced than on prior examination. The lateral recesses are no longer stenotic. No central stenosis or neural foraminal narrowing. Mild facet arthropathy. L5/S1: No central stenosis or neural foraminal narrowing.  IMPRESSION: 1. 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 2. Patient status post L4 laminectomy and partial L4-5 discectomy. Improved appearance of the lateral recesses at the L4-5 level. 3. Mild multilevel spondylosis of lumbar spine, otherwise similar in appearance to prior MRI. EXAM: MR SPINE LUMBAR  PROCEDURE DATE : 11/20/2020  INTERPRETATION: LUMBOSACRAL SPINE MRI  CLINICAL INFORMATION: Low back pain. History of prior surgery with increasing pain down the left lower extremity. TECHNIQUE: Multiplanar, multisequence MR imaging was obtained of the lumbosacral spine. COMPARISON: Lumbar spine MRI 27 January 2018.  FINDINGS:  SPINAL SEGMENTATION: The patient has transitional anatomy. In keeping with prior reporting, the last intervertebral disc space is labeled L5-S1. The right transverse process of L5 forms a small pseudoarticulation with the right sacral ala. The left transverse process of L5 is broadly fused with the sacral ala. SPINAL ALIGNMENT: Lumbar lordosis is maintained. MARROW: Patient status post L4 laminectomy with partial L4-5 discectomy. Vertebral body heights are maintained. Hemangioma is seen in the T12 vertebral body. DISTAL CORD AND CONUS: Conus terminates at the level of L1. Normal morphology of the cauda equina. DISC SPACES: Partial discectomy at the L4-5 intervertebral disc. Remaining intervertebral disc heights are maintained. PARASPINAL MUSCLE AND SOFT TISSUES: Postsurgical changes in the soft tissues along the lower aspect of the lumbar spine. Mild atrophy of the posterior paraspinous musculature. INTRAABDOMINAL/INTRAPELVIC SOFT TISSUES: Within normal limits.  DISC LEVEL EVALUATION:  T12/L1: No central stenosis or neural foraminal narrowing. L1/L2: No central stenosis or neural foraminal narrowing. L2/L3: Small disc bulge. No central stenosis or neural foraminal narrowing. L3/L4: Disc bulge and infolding of ligamentum flavum. No central stenosis or neural foraminal narrowing. L4/L5: Patient status post laminectomy and partial discectomy. The previously described disc herniation is less pronounced than on prior examination. The lateral recesses are no longer stenotic. No central stenosis or neural foraminal narrowing. Mild facet arthropathy. L5/S1: No central stenosis or neural foraminal narrowing.  IMPRESSION: 1. 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 2. Patient status post L4 laminectomy and partial L4-5 discectomy. Improved appearance of the lateral recesses at the L4-5 level. 3. Mild multilevel spondylosis of lumbar spine, otherwise similar in appearance to prior MRI.       SLICK CORREIA MD; Attending Radiologist This document has been electronically signed. Nov 23 2020 1:22PM

## 2024-04-15 ENCOUNTER — APPOINTMENT (OUTPATIENT)
Dept: PAIN MANAGEMENT | Facility: CLINIC | Age: 69
End: 2024-04-15
Payer: MEDICARE

## 2024-04-15 DIAGNOSIS — M79.18 MYALGIA, OTHER SITE: ICD-10-CM

## 2024-04-15 PROCEDURE — 20553 NJX 1/MLT TRIGGER POINTS 3/>: CPT

## 2024-04-15 NOTE — HISTORY OF PRESENT ILLNESS
[Lower back] : lower back [5] : 5 [Intermittent] : intermittent [Heat] : heat [Bending forward] : bending forward [Retired] : Work status: retired [FreeTextEntry1] : TRIGGER POINT INJ ON LSPINE [] : no [de-identified] : 2016 [de-identified] : CURRENT [TWNoteComboBox1] : 90%

## 2024-04-15 NOTE — ASSESSMENT
[FreeTextEntry1] : Interim history The patient returns to the office with pain complaints that we have treated successfully in the past with trigger point injections. The patient states that the pains are the in the same locations and feel the same as they have in the past. The patient denies new symptoms. Objective findings The patient has multiple areas of tenderness. Pressure on which recreated the patient's pain complaints. The are no additional changes in the patient's physical status Plan After a sterile alcohol prep and per office protocol, the patient is given 1 trigger point injections.  The area injected was in the paraspinous muscles on the left side at approximately L4-L5 level.  These were performed through a 27 gauge needle and 0.25% bupivacaine was used as the injectate. The patient tolerated the procedures without incident.  This note was generated by using Dragon medical dictation software.  A reasonable effort has been made for proofreading its contents, but typos may still remain.  If there are any questions or points of clarification needed, please notify my office.

## 2024-04-19 ENCOUNTER — LABORATORY RESULT (OUTPATIENT)
Age: 69
End: 2024-04-19

## 2024-04-24 ENCOUNTER — APPOINTMENT (OUTPATIENT)
Dept: GASTROENTEROLOGY | Facility: CLINIC | Age: 69
End: 2024-04-24
Payer: MEDICARE

## 2024-04-24 VITALS
HEIGHT: 61 IN | WEIGHT: 151 LBS | BODY MASS INDEX: 28.51 KG/M2 | DIASTOLIC BLOOD PRESSURE: 68 MMHG | OXYGEN SATURATION: 98 % | HEART RATE: 76 BPM | SYSTOLIC BLOOD PRESSURE: 122 MMHG

## 2024-04-24 VITALS
WEIGHT: 154 LBS | OXYGEN SATURATION: 97 % | BODY MASS INDEX: 29.07 KG/M2 | HEART RATE: 81 BPM | HEIGHT: 61 IN | DIASTOLIC BLOOD PRESSURE: 79 MMHG | SYSTOLIC BLOOD PRESSURE: 120 MMHG

## 2024-04-24 DIAGNOSIS — K21.9 GASTRO-ESOPHAGEAL REFLUX DISEASE W/OUT ESOPHAGITIS: ICD-10-CM

## 2024-04-24 PROCEDURE — G2211 COMPLEX E/M VISIT ADD ON: CPT

## 2024-04-24 PROCEDURE — 99214 OFFICE O/P EST MOD 30 MIN: CPT

## 2024-04-24 RX ORDER — FAMOTIDINE 40 MG/1
40 TABLET, FILM COATED ORAL DAILY
Qty: 90 | Refills: 3 | Status: ACTIVE | COMMUNITY
Start: 2020-03-11 | End: 1900-01-01

## 2024-04-24 NOTE — HISTORY OF PRESENT ILLNESS
[FreeTextEntry1] : Chata follow-up visit.  Her GERD is controlled on pantoprazole 40 mg in the morning and famotidine 40 mg at bedtime.  She denies dysphagia or dyne aphasia.  She denies abdominal pain.  She denies changes in bowel habits.  She denies rectal bleeding or melena.  She states to have gained weight.  She underwent both an upper endoscopy as well as colonoscopy in July 2022.  Upper endoscopy with mild reflux esophagitis, gastritis negative for H. pylori, fundic gland gastric polyps.  Colonoscopy with benign colon polyp.  She has history of adenomatous colon polyps in the past.

## 2024-04-24 NOTE — PHYSICAL EXAM

## 2024-04-24 NOTE — ASSESSMENT
[FreeTextEntry1] : This is a 69-year-old female with history of chronic GERD and fundic gland polyps.  For the GERD, she is to try to lose weight.  She states that she was laid up recently for sciatica.  This is probably why she gained weight with less mobility.  She will stop the pantoprazole.  I discussed with her possible long-term complications of PPI therapy.  She will continue on famotidine 40 mg at bedtime.  She is to call me if she has worsening symptoms of reflux or abdominal pain.  She will need surveillance colonoscopy in 2027.  I will see her back in the office in 1 years time for a follow-up for visit.  She is to call me with questions or concerns.

## 2024-05-20 ENCOUNTER — RX RENEWAL (OUTPATIENT)
Age: 69
End: 2024-05-20

## 2024-05-20 ENCOUNTER — APPOINTMENT (OUTPATIENT)
Dept: ORTHOPEDIC SURGERY | Facility: CLINIC | Age: 69
End: 2024-05-20
Payer: MEDICARE

## 2024-05-20 VITALS — HEIGHT: 61 IN | BODY MASS INDEX: 28.51 KG/M2 | WEIGHT: 151 LBS

## 2024-05-20 DIAGNOSIS — M43.16 SPONDYLOLISTHESIS, LUMBAR REGION: ICD-10-CM

## 2024-05-20 DIAGNOSIS — M48.07 SPINAL STENOSIS, LUMBOSACRAL REGION: ICD-10-CM

## 2024-05-20 DIAGNOSIS — M47.816 SPONDYLOSIS W/OUT MYELOPATHY OR RADICULOPATHY, LUMBAR REGION: ICD-10-CM

## 2024-05-20 PROCEDURE — 99214 OFFICE O/P EST MOD 30 MIN: CPT

## 2024-05-20 RX ORDER — PANTOPRAZOLE 20 MG/1
20 TABLET, DELAYED RELEASE ORAL DAILY
Qty: 90 | Refills: 0 | Status: ACTIVE | COMMUNITY
Start: 2022-02-14 | End: 1900-01-01

## 2024-05-20 NOTE — HISTORY OF PRESENT ILLNESS
[Bending] : worsened by bending [Heat] : relieved by heat [NSAIDs] : relieved by nonsteroidal anti-inflammatory drugs [Rest] : relieved by rest [de-identified] : Pt s/p L4 laminectomy and partial L4-5 discectomy in 2016 with Dr Be, with acute on chronic lumbar radiculopathy. Pt presents today for f/u. She has new onset of right sided lower back/SI joint pain aggravated with general movement and bending. She has not done treatment for this. Denies RLE numbness, tingling, or weakness. Pt states symptoms are improving since last visit on 1/4/24, Pain on LLE resolved, but states she still gets some pain on left buttock. Pt denies numbness, tingling or weakness on B/L LE. Pt takes Advil 800mg PRN, Pt had LESI X2 with Dr Morejon on 11/30/23 and 01/12/24, this provided 100% pain relief. Pt stopped PT on 12/2023 due to pain, had PT since 08/2023 X3/week.  Trigger point injection on 4/15/2024.   Pt denies fever, chills, weight changes, loss of bladder control, bowel incontinence or urinary retention or saddle anesthesia. [Walking] : not worsened by walking [Physical Therapy] : not relieved by physical therapy [Recumbency] : not relieved by recumbency [Ataxia] : no ataxia [Incontinence] : no incontinence [Loss of Dexterity] : good dexterity [Urinary Ret.] : no urinary retention [de-identified] : Recumbency, getting out of bed exacerbates pain [de-identified] : LESI provides some pain relief

## 2024-05-20 NOTE — PHYSICAL EXAM
[] : Motor: [LE Motor Strength NL] : Motor strength of the bilateral lower extremities was normal [UE/LE] : Sensory: Intact in bilateral upper & lower extremities [0] : left ankle jerk 0 [ALL] : dorsalis pedis, posterior tibial, femoral, popliteal, and radial 2+ and symmetric bilaterally [Normal] : Oriented to person, place, and time, insight and judgement were intact and the affect was normal [Moran's Sign] : negative Moran's sign [Pronator Drift] : negative pronator drift [SLR] : negative straight leg raise [de-identified] : Lumbar ROM: limited, painful TTP b/l paraspinals R region  Skin intact L spine. No rashes, ulcers, blisters.  No lymphedema.  Incision - well healed  [de-identified] : 2 views AP and Lat of Lumbar Spine from [id number] . Read and dictated by Dr. Bubba Powers Board Certified Orthopaedic surgeon L45 laminectomy   EXAM: MR SPINE LUMBAR   PROCEDURE DATE: 11/20/2020    INTERPRETATION: LUMBOSACRAL SPINE MRI  CLINICAL INFORMATION: Low back pain. History of prior surgery with increasing pain down the left lower extremity. TECHNIQUE: Multiplanar, multisequence MR imaging was obtained of the lumbosacral spine. COMPARISON: Lumbar spine MRI 27 January 2018.  FINDINGS:  SPINAL SEGMENTATION: The patient has transitional anatomy. In keeping with prior reporting, the last intervertebral disc space is labeled L5-S1. The right transverse process of L5 forms a small pseudoarticulation with the right sacral ala. The left transverse process of L5 is broadly fused with the sacral ala. SPINAL ALIGNMENT: Lumbar lordosis is maintained. MARROW: Patient status post L4 laminectomy with partial L4-5 discectomy. Vertebral body heights are maintained. Hemangioma is seen in the T12 vertebral body. DISTAL CORD AND CONUS: Conus terminates at the level of L1. Normal morphology of the cauda equina. DISC SPACES: Partial discectomy at the L4-5 intervertebral disc. Remaining intervertebral disc heights are maintained. PARASPINAL MUSCLE AND SOFT TISSUES: Postsurgical changes in the soft tissues along the lower aspect of the lumbar spine. Mild atrophy of the posterior paraspinous musculature. INTRAABDOMINAL/INTRAPELVIC SOFT TISSUES: Within normal limits.  DISC LEVEL EVALUATION:  T12/L1: No central stenosis or neural foraminal narrowing. L1/L2: No central stenosis or neural foraminal narrowing. L2/L3: Small disc bulge. No central stenosis or neural foraminal narrowing. L3/L4: Disc bulge and infolding of ligamentum flavum. No central stenosis or neural foraminal narrowing. L4/L5: Patient status post laminectomy and partial discectomy. The previously described disc herniation is less pronounced than on prior examination. The lateral recesses are no longer stenotic. No central stenosis or neural foraminal narrowing. Mild facet arthropathy. L5/S1: No central stenosis or neural foraminal narrowing.  IMPRESSION: 1. 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 2. Patient status post L4 laminectomy and partial L4-5 discectomy. Improved appearance of the lateral recesses at the L4-5 level. 3. Mild multilevel spondylosis of lumbar spine, otherwise similar in appearance to prior MRI.  EXAM: MR SPINE LUMBAR   PROCEDURE DATE: 11/20/2020    INTERPRETATION: LUMBOSACRAL SPINE MRI  CLINICAL INFORMATION: Low back pain. History of prior surgery with increasing pain down the left lower extremity. TECHNIQUE: Multiplanar, multisequence MR imaging was obtained of the lumbosacral spine. COMPARISON: Lumbar spine MRI 27 January 2018.  FINDINGS:  SPINAL SEGMENTATION: The patient has transitional anatomy. In keeping with prior reporting, the last intervertebral disc space is labeled L5-S1. The right transverse process of L5 forms a small pseudoarticulation with the right sacral ala. The left transverse process of L5 is broadly fused with the sacral ala. SPINAL ALIGNMENT: Lumbar lordosis is maintained. MARROW: Patient status post L4 laminectomy with partial L4-5 discectomy. Vertebral body heights are maintained. Hemangioma is seen in the T12 vertebral body. DISTAL CORD AND CONUS: Conus terminates at the level of L1. Normal morphology of the cauda equina. DISC SPACES: Partial discectomy at the L4-5 intervertebral disc. Remaining intervertebral disc heights are maintained. PARASPINAL MUSCLE AND SOFT TISSUES: Postsurgical changes in the soft tissues along the lower aspect of the lumbar spine. Mild atrophy of the posterior paraspinous musculature. INTRAABDOMINAL/INTRAPELVIC SOFT TISSUES: Within normal limits.  DISC LEVEL EVALUATION:  T12/L1: No central stenosis or neural foraminal narrowing. L1/L2: No central stenosis or neural foraminal narrowing. L2/L3: Small disc bulge. No central stenosis or neural foraminal narrowing. L3/L4: Disc bulge and infolding of ligamentum flavum. No central stenosis or neural foraminal narrowing. L4/L5: Patient status post laminectomy and partial discectomy. The previously described disc herniation is less pronounced than on prior examination. The lateral recesses are no longer stenotic. No central stenosis or neural foraminal narrowing. Mild facet arthropathy. L5/S1: No central stenosis or neural foraminal narrowing.  IMPRESSION: 1. 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 2. Patient status post L4 laminectomy and partial L4-5 discectomy. Improved appearance of the lateral recesses at the L4-5 level. 3. Mild multilevel spondylosis of lumbar spine, otherwise similar in appearance to prior MRI. EXAM: MR SPINE LUMBAR  PROCEDURE DATE : 11/20/2020  INTERPRETATION: LUMBOSACRAL SPINE MRI  CLINICAL INFORMATION: Low back pain. History of prior surgery with increasing pain down the left lower extremity. TECHNIQUE: Multiplanar, multisequence MR imaging was obtained of the lumbosacral spine. COMPARISON: Lumbar spine MRI 27 January 2018.  FINDINGS:  SPINAL SEGMENTATION: The patient has transitional anatomy. In keeping with prior reporting, the last intervertebral disc space is labeled L5-S1. The right transverse process of L5 forms a small pseudoarticulation with the right sacral ala. The left transverse process of L5 is broadly fused with the sacral ala. SPINAL ALIGNMENT: Lumbar lordosis is maintained. MARROW: Patient status post L4 laminectomy with partial L4-5 discectomy. Vertebral body heights are maintained. Hemangioma is seen in the T12 vertebral body. DISTAL CORD AND CONUS: Conus terminates at the level of L1. Normal morphology of the cauda equina. DISC SPACES: Partial discectomy at the L4-5 intervertebral disc. Remaining intervertebral disc heights are maintained. PARASPINAL MUSCLE AND SOFT TISSUES: Postsurgical changes in the soft tissues along the lower aspect of the lumbar spine. Mild atrophy of the posterior paraspinous musculature. INTRAABDOMINAL/INTRAPELVIC SOFT TISSUES: Within normal limits.  DISC LEVEL EVALUATION:  T12/L1: No central stenosis or neural foraminal narrowing. L1/L2: No central stenosis or neural foraminal narrowing. L2/L3: Small disc bulge. No central stenosis or neural foraminal narrowing. L3/L4: Disc bulge and infolding of ligamentum flavum. No central stenosis or neural foraminal narrowing. L4/L5: Patient status post laminectomy and partial discectomy. The previously described disc herniation is less pronounced than on prior examination. The lateral recesses are no longer stenotic. No central stenosis or neural foraminal narrowing. Mild facet arthropathy. L5/S1: No central stenosis or neural foraminal narrowing.  IMPRESSION: 1. 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 2. Patient status post L4 laminectomy and partial L4-5 discectomy. Improved appearance of the lateral recesses at the L4-5 level. 3. Mild multilevel spondylosis of lumbar spine, otherwise similar in appearance to prior MRI.       SLICK CORREIA MD; Attending Radiologist This document has been electronically signed. Nov 23 2020 1:22PM

## 2024-05-20 NOTE — DISCUSSION/SUMMARY
[de-identified] : 68 yo female with lumbar spasms, resolved, recommend PT, HEP, RTO as needed, no surgical intervntion.  Diagnosis, prognosis, natural history and treatment was discussed with patient. Patient was advised if the following symptoms develop: chills, fever,  loss of bladder control, bowel incontinence or urinary retention, numbness/tingling or weakness is present in upper or lower extremities, to go to the nearest emergency room. This may be a new clinical condition not present at the time of the patient visit  that may lead to paralysis and/or death, Patient advised if the above symptoms developed to also call the office immediately to inform us and to go to the nearest emergency room.

## 2024-08-20 ENCOUNTER — APPOINTMENT (OUTPATIENT)
Dept: INTERNAL MEDICINE | Facility: CLINIC | Age: 69
End: 2024-08-20
Payer: MEDICARE

## 2024-08-20 ENCOUNTER — NON-APPOINTMENT (OUTPATIENT)
Age: 69
End: 2024-08-20

## 2024-08-20 VITALS
OXYGEN SATURATION: 97 % | TEMPERATURE: 97.3 F | RESPIRATION RATE: 15 BRPM | BODY MASS INDEX: 28.32 KG/M2 | SYSTOLIC BLOOD PRESSURE: 127 MMHG | HEIGHT: 61 IN | WEIGHT: 150 LBS | DIASTOLIC BLOOD PRESSURE: 79 MMHG | HEART RATE: 71 BPM

## 2024-08-20 DIAGNOSIS — E78.00 PURE HYPERCHOLESTEROLEMIA, UNSPECIFIED: ICD-10-CM

## 2024-08-20 DIAGNOSIS — Z00.00 ENCOUNTER FOR GENERAL ADULT MEDICAL EXAMINATION W/OUT ABNORMAL FINDINGS: ICD-10-CM

## 2024-08-20 DIAGNOSIS — M85.80 OTHER SPECIFIED DISORDERS OF BONE DENSITY AND STRUCTURE, UNSPECIFIED SITE: ICD-10-CM

## 2024-08-20 DIAGNOSIS — K21.9 GASTRO-ESOPHAGEAL REFLUX DISEASE W/OUT ESOPHAGITIS: ICD-10-CM

## 2024-08-20 DIAGNOSIS — I10 ESSENTIAL (PRIMARY) HYPERTENSION: ICD-10-CM

## 2024-08-20 PROCEDURE — G0438: CPT

## 2024-08-20 PROCEDURE — 99203 OFFICE O/P NEW LOW 30 MIN: CPT | Mod: 25

## 2024-08-20 PROCEDURE — 36415 COLL VENOUS BLD VENIPUNCTURE: CPT

## 2024-08-20 NOTE — ASSESSMENT
[FreeTextEntry1] : Health Care Maintenance - well visit - routine labs ordered - depression screen negative - ekg follows cardio Dr. Leary  - mammogram 3/2024 - colonoscopy 7/2022 with GI Dr. Woods, repeat due 2027 - dexa 3/2023 osteopenia  - flu vaccine- reports utd - covid vaccines- reports 4 doses  - tdap 5/2015 - shingles vaccine- reports 2 doses - pneumonia vaccine- s/p [id number] and pneumovax - advised to get annual eye exams with optometry/ophthalmology, skin exams with dermatology, and dental exams  HTN -bp well controlled -c/w losartan-HCTZ  HLD -c/w statin -check cmp and lipid panel  GERD -c/w PPI and famotidine -follows with GI Dr. Woods

## 2024-08-20 NOTE — HISTORY OF PRESENT ILLNESS
[FreeTextEntry1] : cpe/est care [de-identified] : JAMAICA KRAMER is a 85 year M who presents for CPE/est care PMHx HTN, osteopenia, HLD, GERD Feels well overall  GI Dr. Woods Cardio Dr. Leary

## 2024-08-20 NOTE — HEALTH RISK ASSESSMENT
[No] : In the past 12 months have you used drugs other than those required for medical reasons? No [No falls in past year] : Patient reported no falls in the past year [0] : 2) Feeling down, depressed, or hopeless: Not at all (0) [PHQ-2 Negative - No further assessment needed] : PHQ-2 Negative - No further assessment needed [Retired] : retired [] :  [# Of Children ___] : has [unfilled] children [Feels Safe at Home] : Feels safe at home [Fully functional (bathing, dressing, toileting, transferring, walking, feeding)] : Fully functional (bathing, dressing, toileting, transferring, walking, feeding) [Fully functional (using the telephone, shopping, preparing meals, housekeeping, doing laundry, using] : Fully functional and needs no help or supervision to perform IADLs (using the telephone, shopping, preparing meals, housekeeping, doing laundry, using transportation, managing medications and managing finances) [Never] : Never [HIV Test offered] : HIV Test offered [Hepatitis C test offered] : Hepatitis C test offered [[id number]] : 0 [MammogramDate] : 03/24 [MammogramComments] : bi-rads 1 [BoneDensityDate] : 03/23 [BoneDensityComments] : osteopenia  [ColonoscopyDate] : 07/22 [ColonoscopyComments] : GI Dr. Woods, repeat due 2027 [HIVComments] : patient consents  [HepatitisCComments] : patient consents  [de-identified] :

## 2024-08-22 LAB
25(OH)D3 SERPL-MCNC: 39.4 NG/ML
ALBUMIN SERPL ELPH-MCNC: 4.8 G/DL
ALP BLD-CCNC: 111 U/L
ALT SERPL-CCNC: 18 U/L
ANION GAP SERPL CALC-SCNC: 14 MMOL/L
AST SERPL-CCNC: 21 U/L
BILIRUB SERPL-MCNC: 0.3 MG/DL
BUN SERPL-MCNC: 25 MG/DL
CALCIUM SERPL-MCNC: 9.7 MG/DL
CHLORIDE SERPL-SCNC: 103 MMOL/L
CHOLEST SERPL-MCNC: 174 MG/DL
CO2 SERPL-SCNC: 25 MMOL/L
CREAT SERPL-MCNC: 1.24 MG/DL
EGFR: 47 ML/MIN/1.73M2
ESTIMATED AVERAGE GLUCOSE: 123 MG/DL
GLUCOSE SERPL-MCNC: 135 MG/DL
HBA1C MFR BLD HPLC: 5.9 %
HCT VFR BLD CALC: 35.9 %
HCV AB SER QL: NONREACTIVE
HCV S/CO RATIO: 0.19 S/CO
HDLC SERPL-MCNC: 52 MG/DL
HGB BLD-MCNC: 11.5 G/DL
HIV1+2 AB SPEC QL IA.RAPID: NONREACTIVE
LDLC SERPL CALC-MCNC: 98 MG/DL
MCHC RBC-ENTMCNC: 29.6 PG
MCHC RBC-ENTMCNC: 32 GM/DL
MCV RBC AUTO: 92.5 FL
NONHDLC SERPL-MCNC: 121 MG/DL
PLATELET # BLD AUTO: 231 K/UL
POTASSIUM SERPL-SCNC: 4.1 MMOL/L
PROT SERPL-MCNC: 7.3 G/DL
RBC # BLD: 3.88 M/UL
RBC # FLD: 13 %
SODIUM SERPL-SCNC: 141 MMOL/L
TRIGL SERPL-MCNC: 135 MG/DL
TSH SERPL-ACNC: 2.47 UIU/ML
WBC # FLD AUTO: 5.52 K/UL

## 2024-08-28 ENCOUNTER — RX RENEWAL (OUTPATIENT)
Age: 69
End: 2024-08-28

## 2024-09-16 ENCOUNTER — APPOINTMENT (OUTPATIENT)
Dept: DERMATOLOGY | Facility: CLINIC | Age: 69
End: 2024-09-16
Payer: MEDICARE

## 2024-09-16 VITALS — HEIGHT: 60 IN | WEIGHT: 153 LBS | BODY MASS INDEX: 30.04 KG/M2

## 2024-09-16 DIAGNOSIS — L30.9 DERMATITIS, UNSPECIFIED: ICD-10-CM

## 2024-09-16 PROCEDURE — 99214 OFFICE O/P EST MOD 30 MIN: CPT

## 2024-09-16 PROCEDURE — G2211 COMPLEX E/M VISIT ADD ON: CPT

## 2024-09-16 RX ORDER — MOMETASONE FUROATE 1 MG/G
0.1 OINTMENT TOPICAL
Qty: 1 | Refills: 2 | Status: ACTIVE | COMMUNITY
Start: 2024-09-16 | End: 1900-01-01

## 2024-09-16 NOTE — HISTORY OF PRESENT ILLNESS
[FreeTextEntry1] : f/u [de-identified] : 70 yo F here for:  itchy back x 1.5 weeks, has a history of lower back surgery, has not tried any topicals, daughter has psoriasis.

## 2024-09-16 NOTE — ASSESSMENT
[FreeTextEntry1] : #pruritus on upper back, favor early notalgia paresthetica, ddx also includes mild eczematous derm although no evidence of this today #xerosis New diagnosis with uncertain prognosis, flaring - education, counseling - START mometasone ointment BID to affected area only, no more than 2 weeks, avoid face and groin - r/b/a topical steroids were discussed including but not limited to thinning of the skin, atrophy and dyspigmentation. - principles of dry skin care extensively reviewed including the importance of using an emollient at least once a day and avoiding fragranced products including soap and detergent  RTC 6 weeks, will also do TBSE

## 2024-10-29 ENCOUNTER — APPOINTMENT (OUTPATIENT)
Dept: DERMATOLOGY | Facility: CLINIC | Age: 69
End: 2024-10-29

## 2024-10-29 ENCOUNTER — APPOINTMENT (OUTPATIENT)
Dept: INTERNAL MEDICINE | Facility: CLINIC | Age: 69
End: 2024-10-29
Payer: MEDICARE

## 2024-10-29 VITALS
WEIGHT: 150 LBS | BODY MASS INDEX: 28.32 KG/M2 | SYSTOLIC BLOOD PRESSURE: 115 MMHG | RESPIRATION RATE: 15 BRPM | OXYGEN SATURATION: 97 % | HEIGHT: 61 IN | TEMPERATURE: 96.8 F | DIASTOLIC BLOOD PRESSURE: 75 MMHG | HEART RATE: 73 BPM

## 2024-10-29 DIAGNOSIS — R73.03 PREDIABETES.: ICD-10-CM

## 2024-10-29 DIAGNOSIS — I10 ESSENTIAL (PRIMARY) HYPERTENSION: ICD-10-CM

## 2024-10-29 DIAGNOSIS — K21.9 GASTRO-ESOPHAGEAL REFLUX DISEASE W/OUT ESOPHAGITIS: ICD-10-CM

## 2024-10-29 DIAGNOSIS — Z23 ENCOUNTER FOR IMMUNIZATION: ICD-10-CM

## 2024-10-29 DIAGNOSIS — E78.00 PURE HYPERCHOLESTEROLEMIA, UNSPECIFIED: ICD-10-CM

## 2024-10-29 PROCEDURE — G2211 COMPLEX E/M VISIT ADD ON: CPT

## 2024-10-29 PROCEDURE — 99214 OFFICE O/P EST MOD 30 MIN: CPT

## 2024-10-30 LAB
ALBUMIN SERPL ELPH-MCNC: 4.6 G/DL
ALP BLD-CCNC: 116 U/L
ALT SERPL-CCNC: 15 U/L
ANION GAP SERPL CALC-SCNC: 11 MMOL/L
AST SERPL-CCNC: 20 U/L
BILIRUB SERPL-MCNC: 0.4 MG/DL
BUN SERPL-MCNC: 28 MG/DL
CALCIUM SERPL-MCNC: 9.4 MG/DL
CHLORIDE SERPL-SCNC: 99 MMOL/L
CHOLEST SERPL-MCNC: 173 MG/DL
CO2 SERPL-SCNC: 28 MMOL/L
CREAT SERPL-MCNC: 1.19 MG/DL
EGFR: 49 ML/MIN/1.73M2
ESTIMATED AVERAGE GLUCOSE: 123 MG/DL
GLUCOSE SERPL-MCNC: 105 MG/DL
HBA1C MFR BLD HPLC: 5.9 %
HDLC SERPL-MCNC: 51 MG/DL
LDLC SERPL CALC-MCNC: 97 MG/DL
NONHDLC SERPL-MCNC: 123 MG/DL
POTASSIUM SERPL-SCNC: 4.2 MMOL/L
PROT SERPL-MCNC: 7.2 G/DL
SODIUM SERPL-SCNC: 138 MMOL/L
TRIGL SERPL-MCNC: 143 MG/DL

## 2024-11-07 ENCOUNTER — RX RENEWAL (OUTPATIENT)
Age: 69
End: 2024-11-07

## 2025-01-14 ENCOUNTER — APPOINTMENT (OUTPATIENT)
Dept: GASTROENTEROLOGY | Facility: CLINIC | Age: 70
End: 2025-01-14
Payer: MEDICARE

## 2025-01-14 VITALS
WEIGHT: 148 LBS | SYSTOLIC BLOOD PRESSURE: 118 MMHG | DIASTOLIC BLOOD PRESSURE: 68 MMHG | HEART RATE: 73 BPM | BODY MASS INDEX: 27.94 KG/M2 | HEIGHT: 61 IN | OXYGEN SATURATION: 100 %

## 2025-01-14 DIAGNOSIS — R10.13 EPIGASTRIC PAIN: ICD-10-CM

## 2025-01-14 DIAGNOSIS — K21.9 GASTRO-ESOPHAGEAL REFLUX DISEASE W/OUT ESOPHAGITIS: ICD-10-CM

## 2025-01-14 PROCEDURE — 99213 OFFICE O/P EST LOW 20 MIN: CPT

## 2025-01-31 ENCOUNTER — APPOINTMENT (OUTPATIENT)
Dept: GASTROENTEROLOGY | Facility: AMBULATORY MEDICAL SERVICES | Age: 70
End: 2025-01-31
Payer: MEDICARE

## 2025-01-31 PROCEDURE — 43239 EGD BIOPSY SINGLE/MULTIPLE: CPT

## 2025-01-31 RX ORDER — PANTOPRAZOLE 40 MG/1
40 TABLET, DELAYED RELEASE ORAL
Qty: 30 | Refills: 11 | Status: ACTIVE | COMMUNITY
Start: 2025-01-31 | End: 1900-01-01

## 2025-02-10 ENCOUNTER — NON-APPOINTMENT (OUTPATIENT)
Age: 70
End: 2025-02-10

## 2025-02-11 ENCOUNTER — NON-APPOINTMENT (OUTPATIENT)
Age: 70
End: 2025-02-11

## 2025-02-18 ENCOUNTER — APPOINTMENT (OUTPATIENT)
Dept: INTERNAL MEDICINE | Facility: CLINIC | Age: 70
End: 2025-02-18

## 2025-02-18 ENCOUNTER — APPOINTMENT (OUTPATIENT)
Dept: INTERNAL MEDICINE | Facility: CLINIC | Age: 70
End: 2025-02-18
Payer: MEDICARE

## 2025-02-18 VITALS
HEART RATE: 85 BPM | BODY MASS INDEX: 27.94 KG/M2 | HEIGHT: 61 IN | TEMPERATURE: 98 F | DIASTOLIC BLOOD PRESSURE: 73 MMHG | WEIGHT: 148 LBS | SYSTOLIC BLOOD PRESSURE: 127 MMHG | OXYGEN SATURATION: 96 %

## 2025-02-18 DIAGNOSIS — I10 ESSENTIAL (PRIMARY) HYPERTENSION: ICD-10-CM

## 2025-02-18 DIAGNOSIS — E78.00 PURE HYPERCHOLESTEROLEMIA, UNSPECIFIED: ICD-10-CM

## 2025-02-18 DIAGNOSIS — K21.9 GASTRO-ESOPHAGEAL REFLUX DISEASE W/OUT ESOPHAGITIS: ICD-10-CM

## 2025-02-18 DIAGNOSIS — Z12.39 ENCOUNTER FOR OTHER SCREENING FOR MALIGNANT NEOPLASM OF BREAST: ICD-10-CM

## 2025-02-18 DIAGNOSIS — R73.03 PREDIABETES.: ICD-10-CM

## 2025-02-18 PROCEDURE — G2211 COMPLEX E/M VISIT ADD ON: CPT

## 2025-02-18 PROCEDURE — 36415 COLL VENOUS BLD VENIPUNCTURE: CPT

## 2025-02-18 PROCEDURE — 99214 OFFICE O/P EST MOD 30 MIN: CPT

## 2025-02-19 LAB
ALBUMIN SERPL ELPH-MCNC: 4.7 G/DL
ALP BLD-CCNC: 116 U/L
ALT SERPL-CCNC: 19 U/L
ANION GAP SERPL CALC-SCNC: 13 MMOL/L
AST SERPL-CCNC: 23 U/L
BILIRUB SERPL-MCNC: 0.4 MG/DL
BUN SERPL-MCNC: 19 MG/DL
CALCIUM SERPL-MCNC: 9.8 MG/DL
CHLORIDE SERPL-SCNC: 103 MMOL/L
CHOLEST SERPL-MCNC: 194 MG/DL
CO2 SERPL-SCNC: 26 MMOL/L
CREAT SERPL-MCNC: 1.14 MG/DL
EGFR: 52 ML/MIN/1.73M2
ESTIMATED AVERAGE GLUCOSE: 134 MG/DL
GLUCOSE SERPL-MCNC: 90 MG/DL
HBA1C MFR BLD HPLC: 6.3 %
HDLC SERPL-MCNC: 56 MG/DL
LDLC SERPL CALC-MCNC: 110 MG/DL
NONHDLC SERPL-MCNC: 138 MG/DL
POTASSIUM SERPL-SCNC: 4.6 MMOL/L
PROT SERPL-MCNC: 7.5 G/DL
SODIUM SERPL-SCNC: 143 MMOL/L
TRIGL SERPL-MCNC: 156 MG/DL

## 2025-03-07 ENCOUNTER — RESULT REVIEW (OUTPATIENT)
Age: 70
End: 2025-03-07

## 2025-03-07 ENCOUNTER — APPOINTMENT (OUTPATIENT)
Dept: MAMMOGRAPHY | Facility: CLINIC | Age: 70
End: 2025-03-07
Payer: MEDICARE

## 2025-03-07 ENCOUNTER — OUTPATIENT (OUTPATIENT)
Dept: OUTPATIENT SERVICES | Facility: HOSPITAL | Age: 70
LOS: 1 days | End: 2025-03-07
Payer: MEDICARE

## 2025-03-07 DIAGNOSIS — Z98.890 OTHER SPECIFIED POSTPROCEDURAL STATES: Chronic | ICD-10-CM

## 2025-03-07 DIAGNOSIS — M65.332 TRIGGER FINGER, LEFT MIDDLE FINGER: Chronic | ICD-10-CM

## 2025-03-07 DIAGNOSIS — Z98.51 TUBAL LIGATION STATUS: Chronic | ICD-10-CM

## 2025-03-07 DIAGNOSIS — Z12.39 ENCOUNTER FOR OTHER SCREENING FOR MALIGNANT NEOPLASM OF BREAST: ICD-10-CM

## 2025-03-07 PROCEDURE — 77063 BREAST TOMOSYNTHESIS BI: CPT | Mod: 26

## 2025-03-07 PROCEDURE — 77067 SCR MAMMO BI INCL CAD: CPT

## 2025-03-07 PROCEDURE — 77067 SCR MAMMO BI INCL CAD: CPT | Mod: 26

## 2025-03-07 PROCEDURE — 77063 BREAST TOMOSYNTHESIS BI: CPT

## 2025-05-20 ENCOUNTER — NON-APPOINTMENT (OUTPATIENT)
Age: 70
End: 2025-05-20

## 2025-05-22 ENCOUNTER — APPOINTMENT (OUTPATIENT)
Dept: PULMONOLOGY | Facility: CLINIC | Age: 70
End: 2025-05-22
Payer: MEDICARE

## 2025-05-22 VITALS — SYSTOLIC BLOOD PRESSURE: 118 MMHG | HEART RATE: 74 BPM | DIASTOLIC BLOOD PRESSURE: 74 MMHG | OXYGEN SATURATION: 97 %

## 2025-05-22 DIAGNOSIS — K21.9 GASTRO-ESOPHAGEAL REFLUX DISEASE W/OUT ESOPHAGITIS: ICD-10-CM

## 2025-05-22 DIAGNOSIS — R07.89 OTHER CHEST PAIN: ICD-10-CM

## 2025-05-22 PROCEDURE — ZZZZZ: CPT

## 2025-05-22 PROCEDURE — 71046 X-RAY EXAM CHEST 2 VIEWS: CPT

## 2025-05-22 PROCEDURE — 88738 HGB QUANT TRANSCUTANEOUS: CPT

## 2025-05-22 PROCEDURE — 94010 BREATHING CAPACITY TEST: CPT

## 2025-05-22 PROCEDURE — 94727 GAS DIL/WSHOT DETER LNG VOL: CPT

## 2025-05-22 PROCEDURE — 94729 DIFFUSING CAPACITY: CPT

## 2025-05-22 PROCEDURE — 99203 OFFICE O/P NEW LOW 30 MIN: CPT | Mod: 25

## 2025-05-22 PROCEDURE — 36415 COLL VENOUS BLD VENIPUNCTURE: CPT

## 2025-05-22 RX ORDER — DICLOFENAC SODIUM 75 MG/1
75 TABLET, DELAYED RELEASE ORAL TWICE DAILY
Qty: 1 | Refills: 0 | Status: ACTIVE | COMMUNITY
Start: 2025-05-22 | End: 1900-01-01

## 2025-05-27 LAB
ANION GAP SERPL CALC-SCNC: 14 MMOL/L
BUN SERPL-MCNC: 20 MG/DL
CALCIUM SERPL-MCNC: 10 MG/DL
CHLORIDE SERPL-SCNC: 100 MMOL/L
CO2 SERPL-SCNC: 25 MMOL/L
CREAT SERPL-MCNC: 1.37 MG/DL
DEPRECATED D DIMER PPP IA-ACNC: <150 NG/ML DDU
EGFRCR SERPLBLD CKD-EPI 2021: 42 ML/MIN/1.73M2
GLUCOSE SERPL-MCNC: 115 MG/DL
POCT - HEMOGLOBIN (HGB), QUANTITATIVE, TRANSCUTANEOUS: 11.6
POTASSIUM SERPL-SCNC: 4.3 MMOL/L
SODIUM SERPL-SCNC: 139 MMOL/L

## 2025-06-12 ENCOUNTER — APPOINTMENT (OUTPATIENT)
Dept: PULMONOLOGY | Facility: CLINIC | Age: 70
End: 2025-06-12
Payer: MEDICARE

## 2025-06-12 VITALS — HEART RATE: 80 BPM | OXYGEN SATURATION: 97 % | DIASTOLIC BLOOD PRESSURE: 66 MMHG | SYSTOLIC BLOOD PRESSURE: 102 MMHG

## 2025-06-12 PROCEDURE — 99214 OFFICE O/P EST MOD 30 MIN: CPT

## 2025-06-12 PROCEDURE — G2211 COMPLEX E/M VISIT ADD ON: CPT

## 2025-06-12 RX ORDER — FLUTICASONE PROPIONATE 50 UG/1
50 SPRAY NASAL
Qty: 1 | Refills: 5 | Status: ACTIVE | COMMUNITY
Start: 2025-06-12 | End: 1900-01-01

## 2025-07-23 ENCOUNTER — APPOINTMENT (OUTPATIENT)
Dept: PULMONOLOGY | Facility: CLINIC | Age: 70
End: 2025-07-23
Payer: MEDICARE

## 2025-07-23 ENCOUNTER — NON-APPOINTMENT (OUTPATIENT)
Age: 70
End: 2025-07-23

## 2025-07-23 VITALS
SYSTOLIC BLOOD PRESSURE: 129 MMHG | RESPIRATION RATE: 16 BRPM | HEART RATE: 81 BPM | OXYGEN SATURATION: 98 % | DIASTOLIC BLOOD PRESSURE: 77 MMHG

## 2025-07-23 DIAGNOSIS — R07.89 OTHER CHEST PAIN: ICD-10-CM

## 2025-07-23 DIAGNOSIS — K21.9 GASTRO-ESOPHAGEAL REFLUX DISEASE W/OUT ESOPHAGITIS: ICD-10-CM

## 2025-07-23 DIAGNOSIS — R09.82 POSTNASAL DRIP: ICD-10-CM

## 2025-07-23 PROCEDURE — G2211 COMPLEX E/M VISIT ADD ON: CPT

## 2025-07-23 PROCEDURE — 99214 OFFICE O/P EST MOD 30 MIN: CPT

## 2025-08-13 ENCOUNTER — APPOINTMENT (OUTPATIENT)
Dept: INTERNAL MEDICINE | Facility: CLINIC | Age: 70
End: 2025-08-13
Payer: MEDICARE

## 2025-08-13 VITALS
SYSTOLIC BLOOD PRESSURE: 127 MMHG | BODY MASS INDEX: 27 KG/M2 | HEART RATE: 60 BPM | HEIGHT: 61 IN | OXYGEN SATURATION: 98 % | DIASTOLIC BLOOD PRESSURE: 81 MMHG | WEIGHT: 143 LBS

## 2025-08-13 DIAGNOSIS — I10 ESSENTIAL (PRIMARY) HYPERTENSION: ICD-10-CM

## 2025-08-13 DIAGNOSIS — E78.00 PURE HYPERCHOLESTEROLEMIA, UNSPECIFIED: ICD-10-CM

## 2025-08-13 DIAGNOSIS — M85.80 OTHER SPECIFIED DISORDERS OF BONE DENSITY AND STRUCTURE, UNSPECIFIED SITE: ICD-10-CM

## 2025-08-13 DIAGNOSIS — R73.03 PREDIABETES.: ICD-10-CM

## 2025-08-13 DIAGNOSIS — Z00.00 ENCOUNTER FOR GENERAL ADULT MEDICAL EXAMINATION W/OUT ABNORMAL FINDINGS: ICD-10-CM

## 2025-08-13 PROCEDURE — 99214 OFFICE O/P EST MOD 30 MIN: CPT | Mod: 25

## 2025-08-13 PROCEDURE — G2211 COMPLEX E/M VISIT ADD ON: CPT

## 2025-08-13 PROCEDURE — 36415 COLL VENOUS BLD VENIPUNCTURE: CPT

## 2025-08-13 PROCEDURE — G0439: CPT

## 2025-08-14 LAB
25(OH)D3 SERPL-MCNC: 45.4 NG/ML
ALBUMIN SERPL ELPH-MCNC: 4.6 G/DL
ALP BLD-CCNC: 116 U/L
ALT SERPL-CCNC: 20 U/L
ANION GAP SERPL CALC-SCNC: 15 MMOL/L
AST SERPL-CCNC: 27 U/L
BILIRUB SERPL-MCNC: 0.4 MG/DL
BUN SERPL-MCNC: 15 MG/DL
CALCIUM SERPL-MCNC: 9.5 MG/DL
CHLORIDE SERPL-SCNC: 100 MMOL/L
CHOLEST SERPL-MCNC: 156 MG/DL
CO2 SERPL-SCNC: 23 MMOL/L
CREAT SERPL-MCNC: 1.03 MG/DL
EGFRCR SERPLBLD CKD-EPI 2021: 58 ML/MIN/1.73M2
ESTIMATED AVERAGE GLUCOSE: 128 MG/DL
GLUCOSE SERPL-MCNC: 94 MG/DL
HBA1C MFR BLD HPLC: 6.1 %
HCT VFR BLD CALC: 35.9 %
HDLC SERPL-MCNC: 48 MG/DL
HGB BLD-MCNC: 11.9 G/DL
LDLC SERPL-MCNC: 87 MG/DL
MCHC RBC-ENTMCNC: 30.1 PG
MCHC RBC-ENTMCNC: 33.1 G/DL
MCV RBC AUTO: 90.7 FL
NONHDLC SERPL-MCNC: 108 MG/DL
PLATELET # BLD AUTO: 226 K/UL
POTASSIUM SERPL-SCNC: 4.3 MMOL/L
PROT SERPL-MCNC: 7.4 G/DL
RBC # BLD: 3.96 M/UL
RBC # FLD: 12.8 %
SODIUM SERPL-SCNC: 138 MMOL/L
TRIGL SERPL-MCNC: 121 MG/DL
TSH SERPL-ACNC: 2.65 UIU/ML
WBC # FLD AUTO: 6.1 K/UL

## 2025-08-27 ENCOUNTER — OUTPATIENT (OUTPATIENT)
Dept: OUTPATIENT SERVICES | Facility: HOSPITAL | Age: 70
LOS: 1 days | End: 2025-08-27
Payer: MEDICARE

## 2025-08-27 ENCOUNTER — APPOINTMENT (OUTPATIENT)
Dept: RADIOLOGY | Facility: CLINIC | Age: 70
End: 2025-08-27
Payer: MEDICARE

## 2025-08-27 DIAGNOSIS — Z98.51 TUBAL LIGATION STATUS: Chronic | ICD-10-CM

## 2025-08-27 DIAGNOSIS — Z98.890 OTHER SPECIFIED POSTPROCEDURAL STATES: Chronic | ICD-10-CM

## 2025-08-27 DIAGNOSIS — M85.80 OTHER SPECIFIED DISORDERS OF BONE DENSITY AND STRUCTURE, UNSPECIFIED SITE: ICD-10-CM

## 2025-08-27 DIAGNOSIS — M65.332 TRIGGER FINGER, LEFT MIDDLE FINGER: Chronic | ICD-10-CM

## 2025-08-27 PROCEDURE — 77085 DXA BONE DENSITY AXL VRT FX: CPT

## 2025-08-27 PROCEDURE — 77085 DXA BONE DENSITY AXL VRT FX: CPT | Mod: 26

## (undated) DEVICE — TUBING ALARIS PUMP MODULE NON-DEHP

## (undated) DEVICE — CATH IV SAFE BC 22G X 1" (BLUE)

## (undated) DEVICE — PACK IV START WITH CHG

## (undated) DEVICE — MEDICATION LABELS W MARKER

## (undated) DEVICE — SOL INJ NS 0.9% 500ML 2 PORT

## (undated) DEVICE — SUCTION YANKAUER NO CONTROL VENT

## (undated) DEVICE — BITE BLOCK ADULT 20 X 27MM (GREEN)

## (undated) DEVICE — SYR ALLIANCE II INFLATION 60ML

## (undated) DEVICE — COLONOSCOPE 2416901: Type: DURABLE MEDICAL EQUIPMENT

## (undated) DEVICE — FORCEP RADIAL JAW 4 JUMBO 2.8MM 3.2MM 240CM ORANGE DISP

## (undated) DEVICE — BIOPSY FORCEP RADIAL JAW 4 STANDARD WITH NEEDLE

## (undated) DEVICE — CATH IV SAFE BC 20G X 1.16" (PINK)

## (undated) DEVICE — STYLET  ENDOTRACH 7.5MM X 10MM

## (undated) DEVICE — IRRIGATOR BIO SHIELD

## (undated) DEVICE — DRSG DERMABOND 0.7ML

## (undated) DEVICE — TOURNIQUET CUFF 12" DUAL PORT W PLC

## (undated) DEVICE — SOL IRR POUR NS 0.9% 500ML

## (undated) DEVICE — CLAMP BX HOT RAD JAW 3

## (undated) DEVICE — SYR IV FLUSH SALINE 10ML 30/TY

## (undated) DEVICE — DRAPE TOWEL BLUE 17" X 24"

## (undated) DEVICE — TUBING IV SET GRAVITY 3Y 100" MACRO

## (undated) DEVICE — TUBING IV EXTENSION MACRO W CLAVE 7"

## (undated) DEVICE — SUT MONOCRYL 5-0 18" P-3 UNDYED

## (undated) DEVICE — TUBING SUCTION CONN 6FT STERILE

## (undated) DEVICE — POLY TRAP ETRAP

## (undated) DEVICE — DRSG STERISTRIPS 0.5 X 4"

## (undated) DEVICE — GLV 8.5 PROTEXIS (WHITE)

## (undated) DEVICE — SPECIMEN CONTAINER 100ML

## (undated) DEVICE — TUBING SUCTION 20FT

## (undated) DEVICE — PACK HAND

## (undated) DEVICE — SENSOR O2 FINGER ADULT

## (undated) DEVICE — NDL SPINAL 22G X 3.5" QUINCKE

## (undated) DEVICE — SYR LUER LOK 50CC

## (undated) DEVICE — GLV 7 PROTEXIS (WHITE)

## (undated) DEVICE — TOURNIQUET CUFF 18" DUAL PORT SINGLE BLADDER W PLC  (BLACK)

## (undated) DEVICE — SOL INJ LR 500ML

## (undated) DEVICE — TRAY EPIDURAL SINGLE DOSE

## (undated) DEVICE — PREP CHLORAPREP HI-LITE ORANGE 26ML

## (undated) DEVICE — DRSG ACE BANDAGE 2"

## (undated) DEVICE — BRUSH COLONOSCOPY CYTOLOGY

## (undated) DEVICE — ELCTR GROUNDING PAD ADULT COVIDIEN

## (undated) DEVICE — FOLEY HOLDER STATLOCK 2 WAY ADULT

## (undated) DEVICE — BALLOON US ENDO